# Patient Record
Sex: MALE | Race: AMERICAN INDIAN OR ALASKA NATIVE
[De-identification: names, ages, dates, MRNs, and addresses within clinical notes are randomized per-mention and may not be internally consistent; named-entity substitution may affect disease eponyms.]

---

## 2017-01-16 ENCOUNTER — HOSPITAL ENCOUNTER (EMERGENCY)
Dept: HOSPITAL 5 - ED | Age: 57
LOS: 1 days | Discharge: HOME | End: 2017-01-17
Payer: MEDICARE

## 2017-01-16 VITALS — DIASTOLIC BLOOD PRESSURE: 99 MMHG | SYSTOLIC BLOOD PRESSURE: 140 MMHG

## 2017-01-16 DIAGNOSIS — J45.909: ICD-10-CM

## 2017-01-16 DIAGNOSIS — Z98.890: ICD-10-CM

## 2017-01-16 DIAGNOSIS — M19.90: ICD-10-CM

## 2017-01-16 DIAGNOSIS — M77.8: ICD-10-CM

## 2017-01-16 DIAGNOSIS — K08.89: ICD-10-CM

## 2017-01-16 DIAGNOSIS — I10: ICD-10-CM

## 2017-01-16 DIAGNOSIS — G89.21: Primary | ICD-10-CM

## 2017-01-16 DIAGNOSIS — F17.200: ICD-10-CM

## 2017-01-16 DIAGNOSIS — K40.90: ICD-10-CM

## 2017-01-16 DIAGNOSIS — K21.9: ICD-10-CM

## 2017-01-16 LAB
ANION GAP SERPL CALC-SCNC: 16 MMOL/L
BASOPHILS NFR BLD AUTO: 0.8 % (ref 0–1.8)
BUN SERPL-MCNC: 6 MG/DL (ref 9–20)
BUN/CREAT SERPL: 7.5 %
CALCIUM SERPL-MCNC: 8.5 MG/DL (ref 8.4–10.2)
CHLORIDE SERPL-SCNC: 105.4 MMOL/L (ref 98–107)
CO2 SERPL-SCNC: 22 MMOL/L (ref 22–30)
EOSINOPHIL NFR BLD AUTO: 9.1 % (ref 0–4.3)
GLUCOSE SERPL-MCNC: 98 MG/DL (ref 75–100)
HCT VFR BLD CALC: 40.3 % (ref 35.5–45.6)
HGB BLD-MCNC: 13.4 GM/DL (ref 11.8–15.2)
MCH RBC QN AUTO: 32 PG (ref 28–32)
MCHC RBC AUTO-ENTMCNC: 33 % (ref 32–34)
MCV RBC AUTO: 97 FL (ref 84–94)
PLATELET # BLD: 259 K/MM3 (ref 140–440)
POTASSIUM SERPL-SCNC: 3.6 MMOL/L (ref 3.6–5)
RBC # BLD AUTO: 4.17 M/MM3 (ref 3.65–5.03)
SODIUM SERPL-SCNC: 140 MMOL/L (ref 137–145)
WBC # BLD AUTO: 9.2 K/MM3 (ref 4.5–11)

## 2017-01-16 PROCEDURE — 99283 EMERGENCY DEPT VISIT LOW MDM: CPT

## 2017-01-16 PROCEDURE — 80048 BASIC METABOLIC PNL TOTAL CA: CPT

## 2017-01-16 PROCEDURE — 96372 THER/PROPH/DIAG INJ SC/IM: CPT

## 2017-01-16 PROCEDURE — 85025 COMPLETE CBC W/AUTO DIFF WBC: CPT

## 2017-01-16 PROCEDURE — 36415 COLL VENOUS BLD VENIPUNCTURE: CPT

## 2017-01-16 PROCEDURE — 73030 X-RAY EXAM OF SHOULDER: CPT

## 2017-01-16 NOTE — XRAY REPORT
FINAL REPORT



PROCEDURE:  XR SHOULDER 2 RT



TECHNIQUE:  Right shoulder radiographs including AP views in

internal and external rotation and abduction. CPT 75812







HISTORY:  right shoulder pain 



COMPARISON:  No prior studies are available for comparison.



FINDINGS:  

Fracture (s) and/or Dislocation(s): None .



Joint space(s): Spurring of the acromioclavicular joint.



Soft tissues: Normal .



Bone mineralization: Normal .



Foreign bodies: None .







IMPRESSION:  

Acromioclavicular spurring
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
## 2017-03-11 ENCOUNTER — HOSPITAL ENCOUNTER (EMERGENCY)
Dept: HOSPITAL 5 - ED | Age: 57
LOS: 1 days | Discharge: HOME | End: 2017-03-12
Payer: COMMERCIAL
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
## 2017-03-11 DIAGNOSIS — S16.1XXA: Primary | ICD-10-CM
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
## 2017-03-11 DIAGNOSIS — S30.0XXA: ICD-10-CM
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
## 2017-03-11 PROCEDURE — 72125 CT NECK SPINE W/O DYE: CPT

## 2017-03-11 PROCEDURE — 72131 CT LUMBAR SPINE W/O DYE: CPT

## 2017-03-11 PROCEDURE — 99284 EMERGENCY DEPT VISIT MOD MDM: CPT

## 2017-03-11 PROCEDURE — 73502 X-RAY EXAM HIP UNI 2-3 VIEWS: CPT

## 2017-03-11 PROCEDURE — 94640 AIRWAY INHALATION TREATMENT: CPT

## 2017-03-11 PROCEDURE — 96372 THER/PROPH/DIAG INJ SC/IM: CPT

## 2017-03-12 VITALS — DIASTOLIC BLOOD PRESSURE: 97 MMHG | SYSTOLIC BLOOD PRESSURE: 139 MMHG
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
## 2017-09-18 ENCOUNTER — HOSPITAL ENCOUNTER (EMERGENCY)
Dept: HOSPITAL 5 - ED | Age: 57
Discharge: HOME | End: 2017-09-18
Payer: MEDICARE

## 2017-09-18 VITALS — SYSTOLIC BLOOD PRESSURE: 153 MMHG | DIASTOLIC BLOOD PRESSURE: 97 MMHG

## 2017-09-18 DIAGNOSIS — M19.90: ICD-10-CM

## 2017-09-18 DIAGNOSIS — F17.200: ICD-10-CM

## 2017-09-18 DIAGNOSIS — I10: ICD-10-CM

## 2017-09-18 DIAGNOSIS — K02.9: Primary | ICD-10-CM

## 2017-09-18 DIAGNOSIS — K21.9: ICD-10-CM

## 2017-09-18 DIAGNOSIS — J45.909: ICD-10-CM

## 2017-09-18 DIAGNOSIS — I25.2: ICD-10-CM

## 2017-09-18 DIAGNOSIS — J32.9: ICD-10-CM

## 2017-09-18 LAB
ALBUMIN SERPL-MCNC: 4.3 G/DL (ref 3.9–5)
ALBUMIN/GLOB SERPL: 1.3 %
ALP SERPL-CCNC: 65 UNITS/L (ref 35–129)
ALT SERPL-CCNC: 25 UNITS/L (ref 7–56)
ANION GAP SERPL CALC-SCNC: 18 MMOL/L
BILIRUB SERPL-MCNC: 0.5 MG/DL (ref 0.1–1.2)
BLASTOCYTES % (MANUAL): 0 %
BUN SERPL-MCNC: 11 MG/DL (ref 9–20)
BUN/CREAT SERPL: 15.71 %
CALCIUM SERPL-MCNC: 9.5 MG/DL (ref 8.4–10.2)
CHLORIDE SERPL-SCNC: 104.9 MMOL/L (ref 98–107)
CO2 SERPL-SCNC: 21 MMOL/L (ref 22–30)
GLUCOSE SERPL-MCNC: 91 MG/DL (ref 75–100)
HCT VFR BLD CALC: 45.9 % (ref 35.5–45.6)
HGB BLD-MCNC: 15.8 GM/DL (ref 11.8–15.2)
MCH RBC QN AUTO: 33 PG (ref 28–32)
MCHC RBC AUTO-ENTMCNC: 35 % (ref 32–34)
MCV RBC AUTO: 95 FL (ref 84–94)
PLATELET # BLD: 250 K/MM3 (ref 140–440)
POTASSIUM SERPL-SCNC: 3.9 MMOL/L (ref 3.6–5)
PROT SERPL-MCNC: 7.6 G/DL (ref 6.3–8.2)
RBC # BLD AUTO: 4.84 M/MM3 (ref 3.65–5.03)
SODIUM SERPL-SCNC: 140 MMOL/L (ref 137–145)
TOTAL CELLS COUNTED PERCENT: 22
WBC # BLD AUTO: 7.7 K/MM3 (ref 4.5–11)

## 2017-09-18 PROCEDURE — 71020: CPT

## 2017-09-18 PROCEDURE — 85025 COMPLETE CBC W/AUTO DIFF WBC: CPT

## 2017-09-18 PROCEDURE — 80053 COMPREHEN METABOLIC PANEL: CPT

## 2017-09-18 PROCEDURE — 85007 BL SMEAR W/DIFF WBC COUNT: CPT

## 2017-09-18 PROCEDURE — 36415 COLL VENOUS BLD VENIPUNCTURE: CPT

## 2017-09-18 PROCEDURE — 96372 THER/PROPH/DIAG INJ SC/IM: CPT

## 2017-09-18 PROCEDURE — 99284 EMERGENCY DEPT VISIT MOD MDM: CPT

## 2017-09-18 NOTE — EMERGENCY DEPARTMENT REPORT
Chief Complaint: Pain General


Stated Complaint: HIGH BP/DIZZINESS/VOMITING


Time Seen by Provider: 09/18/17 11:01





- HPI


History of Present Illness: 





PT c/o facial tightness and ear pain.  PT States his nose feels stopped up.  PT 

states he has tooth pain, cough and sore throat 





PT states today, he coughed so hard, he vomited


pt also states coughing hurts his hernia 





- ROS


Review of Systems: 





+ cough 








- Exam


Vital Signs: 


 Vital Signs











  09/18/17





  10:55


 


Temperature 97.9 F


 


Pulse Rate 65


 


Respiratory 20





Rate 


 


Blood Pressure 156/105


 


O2 Sat by Pulse 100





Oximetry 











Physical Exam: 





PT is alert and appropriate


no focal weakness


no facial swelling noted. 


MSE screening note: 


Focused history and physical exam performed.


Due to findings the following was ordered:





labs, xr 





ED Disposition for MSE


Condition: Stable
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
## 2018-05-20 ENCOUNTER — HOSPITAL ENCOUNTER (EMERGENCY)
Dept: HOSPITAL 5 - ED | Age: 58
Discharge: HOME | End: 2018-05-20
Payer: MEDICARE

## 2018-05-20 VITALS — SYSTOLIC BLOOD PRESSURE: 118 MMHG | DIASTOLIC BLOOD PRESSURE: 83 MMHG

## 2018-05-20 DIAGNOSIS — I10: ICD-10-CM

## 2018-05-20 DIAGNOSIS — F41.9: ICD-10-CM

## 2018-05-20 DIAGNOSIS — K40.90: Primary | ICD-10-CM

## 2018-05-20 DIAGNOSIS — F17.200: ICD-10-CM

## 2018-05-20 DIAGNOSIS — I25.2: ICD-10-CM

## 2018-05-20 DIAGNOSIS — K21.9: ICD-10-CM

## 2018-05-20 DIAGNOSIS — M19.90: ICD-10-CM

## 2018-05-20 LAB
ALBUMIN SERPL-MCNC: 4 G/DL (ref 3.9–5)
ALT SERPL-CCNC: 12 UNITS/L (ref 7–56)
BAND NEUTROPHILS # (MANUAL): 0 K/MM3
BILIRUB UR QL STRIP: (no result)
BLOOD UR QL VISUAL: (no result)
BUN SERPL-MCNC: 5 MG/DL (ref 9–20)
BUN/CREAT SERPL: 7 %
CALCIUM SERPL-MCNC: 9.3 MG/DL (ref 8.4–10.2)
HCT VFR BLD CALC: 41.1 % (ref 35.5–45.6)
HEMOLYSIS INDEX: 3
HGB BLD-MCNC: 13.8 GM/DL (ref 11.8–15.2)
MCH RBC QN AUTO: 31 PG (ref 28–32)
MCHC RBC AUTO-ENTMCNC: 34 % (ref 32–34)
MCV RBC AUTO: 93 FL (ref 84–94)
MYELOCYTES # (MANUAL): 0 K/MM3
PH UR STRIP: 7 [PH] (ref 5–7)
PLATELET # BLD: 306 K/MM3 (ref 140–440)
PROMYELOCYTES # (MANUAL): 0 K/MM3
PROT UR STRIP-MCNC: (no result) MG/DL
RBC # BLD AUTO: 4.43 M/MM3 (ref 3.65–5.03)
RBC #/AREA URNS HPF: 1 /HPF (ref 0–6)
TOTAL CELLS COUNTED BLD: 100
UROBILINOGEN UR-MCNC: < 2 MG/DL (ref ?–2)
WBC #/AREA URNS HPF: < 1 /HPF (ref 0–6)

## 2018-05-20 PROCEDURE — 80053 COMPREHEN METABOLIC PANEL: CPT

## 2018-05-20 PROCEDURE — 84484 ASSAY OF TROPONIN QUANT: CPT

## 2018-05-20 PROCEDURE — 81001 URINALYSIS AUTO W/SCOPE: CPT

## 2018-05-20 PROCEDURE — 85025 COMPLETE CBC W/AUTO DIFF WBC: CPT

## 2018-05-20 PROCEDURE — 93005 ELECTROCARDIOGRAM TRACING: CPT

## 2018-05-20 PROCEDURE — 83690 ASSAY OF LIPASE: CPT

## 2018-05-20 PROCEDURE — 93010 ELECTROCARDIOGRAM REPORT: CPT

## 2018-05-20 PROCEDURE — 99284 EMERGENCY DEPT VISIT MOD MDM: CPT

## 2018-05-20 PROCEDURE — 36415 COLL VENOUS BLD VENIPUNCTURE: CPT

## 2018-05-20 PROCEDURE — 71045 X-RAY EXAM CHEST 1 VIEW: CPT

## 2018-05-20 PROCEDURE — 85007 BL SMEAR W/DIFF WBC COUNT: CPT

## 2018-05-20 NOTE — XRAY REPORT
FINAL REPORT



PROCEDURE:  XR CHEST 1V AP



TECHNIQUE:  Chest radiograph anteroposterior view. CPT 64685







HISTORY:  Chest pain 



COMPARISON:  No prior studies are available for comparison.



FINDINGS:  

Heart: The cardiac silhouette is at the upper limit of normal in

size 



Mediastinum/Vessels: Normal.



Lungs/Pleural space: No infiltrate, effusion, or pneumothorax.



Bony thorax: No acute osseous abnormality.



Life support devices: None.



IMPRESSION:  

No radiographic evidence of acute cardiopulmonary abnormality.

## 2018-05-20 NOTE — EMERGENCY DEPARTMENT REPORT
Blank Doc





- Documentation


Documentation: 





57-year-old male presents to the hospital with multiple complaints. He 

complains of chest pain bilateral left greater than right.  This is 

reproducible on palpation.  He also complains of one week of difficulty 

swallowing liquids.  He states that it makes him uncomfortable and he feels 

like it stuck in his throat which makes it hard for him to breathe.  He 

complains of feeling like his throat is closing not with associated shortness 

of breath





On exam oropharynx looks normal.  No stridor.  Reproducible anterior chest wall 

tenderness and mild expiratory wheezing





Pt will be transfered to the acute side for further evaluation.
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
## 2018-07-29 ENCOUNTER — HOSPITAL ENCOUNTER (EMERGENCY)
Dept: HOSPITAL 5 - ED | Age: 58
Discharge: HOME | End: 2018-07-29
Payer: MEDICARE

## 2018-07-29 VITALS — DIASTOLIC BLOOD PRESSURE: 91 MMHG | SYSTOLIC BLOOD PRESSURE: 133 MMHG

## 2018-07-29 DIAGNOSIS — M19.90: ICD-10-CM

## 2018-07-29 DIAGNOSIS — F17.200: ICD-10-CM

## 2018-07-29 DIAGNOSIS — I10: ICD-10-CM

## 2018-07-29 DIAGNOSIS — G89.29: ICD-10-CM

## 2018-07-29 DIAGNOSIS — K21.9: ICD-10-CM

## 2018-07-29 DIAGNOSIS — J45.909: ICD-10-CM

## 2018-07-29 DIAGNOSIS — I25.2: ICD-10-CM

## 2018-07-29 DIAGNOSIS — Z79.82: ICD-10-CM

## 2018-07-29 DIAGNOSIS — M79.652: Primary | ICD-10-CM

## 2018-07-29 PROCEDURE — 96372 THER/PROPH/DIAG INJ SC/IM: CPT

## 2018-07-29 PROCEDURE — 99282 EMERGENCY DEPT VISIT SF MDM: CPT
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
## 2018-09-12 ENCOUNTER — HOSPITAL ENCOUNTER (EMERGENCY)
Dept: HOSPITAL 5 - ED | Age: 58
Discharge: HOME | End: 2018-09-12
Payer: MEDICARE

## 2018-09-12 VITALS — SYSTOLIC BLOOD PRESSURE: 165 MMHG | DIASTOLIC BLOOD PRESSURE: 94 MMHG

## 2018-09-12 DIAGNOSIS — I25.2: ICD-10-CM

## 2018-09-12 DIAGNOSIS — Z79.82: ICD-10-CM

## 2018-09-12 DIAGNOSIS — F12.90: ICD-10-CM

## 2018-09-12 DIAGNOSIS — F17.200: ICD-10-CM

## 2018-09-12 DIAGNOSIS — M54.9: ICD-10-CM

## 2018-09-12 DIAGNOSIS — J45.909: ICD-10-CM

## 2018-09-12 DIAGNOSIS — M19.90: ICD-10-CM

## 2018-09-12 DIAGNOSIS — K21.9: ICD-10-CM

## 2018-09-12 DIAGNOSIS — M25.552: Primary | ICD-10-CM

## 2018-09-12 DIAGNOSIS — G89.29: ICD-10-CM

## 2018-09-12 DIAGNOSIS — I10: ICD-10-CM

## 2018-09-12 PROCEDURE — 99283 EMERGENCY DEPT VISIT LOW MDM: CPT

## 2018-09-12 NOTE — XRAY REPORT
LEFT HIP, 2 views:



History: Chronic pain.



Normal bone mineralization. Minimal osteoarthritic changes are 

identified at the left knee. No evidence for fracture, bone lesion or 

osteonecrosis. Left inguinal hernia repair is suspected.



IMPRESSION:

Minimal osteoarthritis at the left hip.
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
## 2019-01-13 ENCOUNTER — HOSPITAL ENCOUNTER (EMERGENCY)
Dept: HOSPITAL 5 - ED | Age: 59
Discharge: HOME | End: 2019-01-13
Payer: MEDICARE

## 2019-01-13 VITALS — DIASTOLIC BLOOD PRESSURE: 97 MMHG | SYSTOLIC BLOOD PRESSURE: 150 MMHG

## 2019-01-13 DIAGNOSIS — K21.9: ICD-10-CM

## 2019-01-13 DIAGNOSIS — M19.90: ICD-10-CM

## 2019-01-13 DIAGNOSIS — Z79.899: ICD-10-CM

## 2019-01-13 DIAGNOSIS — F12.10: ICD-10-CM

## 2019-01-13 DIAGNOSIS — I25.2: ICD-10-CM

## 2019-01-13 DIAGNOSIS — J40: Primary | ICD-10-CM

## 2019-01-13 DIAGNOSIS — J45.909: ICD-10-CM

## 2019-01-13 DIAGNOSIS — G89.29: ICD-10-CM

## 2019-01-13 DIAGNOSIS — I10: ICD-10-CM

## 2019-01-13 DIAGNOSIS — F17.200: ICD-10-CM

## 2019-01-13 LAB
ALBUMIN SERPL-MCNC: 4 G/DL (ref 3.9–5)
ALT SERPL-CCNC: 11 UNITS/L (ref 7–56)
BASOPHILS # (AUTO): 0.1 K/MM3 (ref 0–0.1)
BASOPHILS NFR BLD AUTO: 0.6 % (ref 0–1.8)
BUN SERPL-MCNC: 5 MG/DL (ref 9–20)
BUN/CREAT SERPL: 7 %
CALCIUM SERPL-MCNC: 9.1 MG/DL (ref 8.4–10.2)
EOSINOPHIL # BLD AUTO: 0.9 K/MM3 (ref 0–0.4)
EOSINOPHIL NFR BLD AUTO: 10.4 % (ref 0–4.3)
HCT VFR BLD CALC: 37.6 % (ref 35.5–45.6)
HEMOLYSIS INDEX: 6
HGB BLD-MCNC: 12.5 GM/DL (ref 11.8–15.2)
LYMPHOCYTES # BLD AUTO: 2.4 K/MM3 (ref 1.2–5.4)
LYMPHOCYTES NFR BLD AUTO: 27.6 % (ref 13.4–35)
MCHC RBC AUTO-ENTMCNC: 33 % (ref 32–34)
MCV RBC AUTO: 93 FL (ref 84–94)
MONOCYTES # (AUTO): 0.5 K/MM3 (ref 0–0.8)
MONOCYTES % (AUTO): 6.2 % (ref 0–7.3)
PLATELET # BLD: 298 K/MM3 (ref 140–440)
RBC # BLD AUTO: 4.05 M/MM3 (ref 3.65–5.03)

## 2019-01-13 PROCEDURE — 83690 ASSAY OF LIPASE: CPT

## 2019-01-13 PROCEDURE — 99284 EMERGENCY DEPT VISIT MOD MDM: CPT

## 2019-01-13 PROCEDURE — 93010 ELECTROCARDIOGRAM REPORT: CPT

## 2019-01-13 PROCEDURE — 85025 COMPLETE CBC W/AUTO DIFF WBC: CPT

## 2019-01-13 PROCEDURE — 36415 COLL VENOUS BLD VENIPUNCTURE: CPT

## 2019-01-13 PROCEDURE — 71046 X-RAY EXAM CHEST 2 VIEWS: CPT

## 2019-01-13 PROCEDURE — 84484 ASSAY OF TROPONIN QUANT: CPT

## 2019-01-13 PROCEDURE — 93005 ELECTROCARDIOGRAM TRACING: CPT

## 2019-01-13 PROCEDURE — 94640 AIRWAY INHALATION TREATMENT: CPT

## 2019-01-13 PROCEDURE — 80053 COMPREHEN METABOLIC PANEL: CPT

## 2019-01-13 NOTE — XRAY REPORT
FINAL REPORT



EXAM:  XR CHEST ROUTINE 2V



HISTORY:  cough fever 



TECHNIQUE:  PA and lateral chest radiographs 



PRIORS:  05/20/2018



FINDINGS:  

No mediastinal shift. Cardiac silhouette is not enlarged.  No pneumothorax, effusion, or focal pulmon
brayan opacity. No acute skeletal finding. 



IMPRESSION:  

No focal pulmonary opacity.
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
## 2019-08-07 ENCOUNTER — HOSPITAL ENCOUNTER (EMERGENCY)
Dept: HOSPITAL 5 - ED | Age: 59
LOS: 1 days | Discharge: HOME | End: 2019-08-08
Payer: MEDICARE

## 2019-08-07 DIAGNOSIS — K08.89: ICD-10-CM

## 2019-08-07 DIAGNOSIS — Z79.899: ICD-10-CM

## 2019-08-07 DIAGNOSIS — F17.200: ICD-10-CM

## 2019-08-07 DIAGNOSIS — J40: ICD-10-CM

## 2019-08-07 DIAGNOSIS — M19.90: ICD-10-CM

## 2019-08-07 DIAGNOSIS — G89.29: ICD-10-CM

## 2019-08-07 DIAGNOSIS — K21.9: ICD-10-CM

## 2019-08-07 DIAGNOSIS — I25.2: ICD-10-CM

## 2019-08-07 DIAGNOSIS — R07.89: Primary | ICD-10-CM

## 2019-08-07 DIAGNOSIS — F12.10: ICD-10-CM

## 2019-08-07 DIAGNOSIS — I10: ICD-10-CM

## 2019-08-07 LAB
BASOPHILS # (AUTO): 0.1 K/MM3 (ref 0–0.1)
BASOPHILS NFR BLD AUTO: 0.8 % (ref 0–1.8)
EOSINOPHIL # BLD AUTO: 1 K/MM3 (ref 0–0.4)
EOSINOPHIL NFR BLD AUTO: 10.8 % (ref 0–4.3)
HCT VFR BLD CALC: 39.1 % (ref 35.5–45.6)
HGB BLD-MCNC: 13.1 GM/DL (ref 11.8–15.2)
LYMPHOCYTES # BLD AUTO: 2.6 K/MM3 (ref 1.2–5.4)
LYMPHOCYTES NFR BLD AUTO: 26.8 % (ref 13.4–35)
MCHC RBC AUTO-ENTMCNC: 34 % (ref 32–34)
MCV RBC AUTO: 94 FL (ref 84–94)
MONOCYTES # (AUTO): 0.7 K/MM3 (ref 0–0.8)
MONOCYTES % (AUTO): 6.9 % (ref 0–7.3)
PLATELET # BLD: 247 K/MM3 (ref 140–440)
RBC # BLD AUTO: 4.17 M/MM3 (ref 3.65–5.03)

## 2019-08-07 PROCEDURE — 84484 ASSAY OF TROPONIN QUANT: CPT

## 2019-08-07 PROCEDURE — 94640 AIRWAY INHALATION TREATMENT: CPT

## 2019-08-07 PROCEDURE — 82553 CREATINE MB FRACTION: CPT

## 2019-08-07 PROCEDURE — 71046 X-RAY EXAM CHEST 2 VIEWS: CPT

## 2019-08-07 PROCEDURE — 36415 COLL VENOUS BLD VENIPUNCTURE: CPT

## 2019-08-07 PROCEDURE — 93010 ELECTROCARDIOGRAM REPORT: CPT

## 2019-08-07 PROCEDURE — 96372 THER/PROPH/DIAG INJ SC/IM: CPT

## 2019-08-07 PROCEDURE — 93005 ELECTROCARDIOGRAM TRACING: CPT

## 2019-08-07 PROCEDURE — 94644 CONT INHLJ TX 1ST HOUR: CPT

## 2019-08-07 PROCEDURE — 82550 ASSAY OF CK (CPK): CPT

## 2019-08-07 PROCEDURE — 85025 COMPLETE CBC W/AUTO DIFF WBC: CPT

## 2019-08-07 PROCEDURE — 99284 EMERGENCY DEPT VISIT MOD MDM: CPT

## 2019-08-07 NOTE — XRAY REPORT
CHEST 2 VIEWS 



INDICATION: 

Dyspnea.



COMPARISON: 

9/18/2017.



FINDINGS:

Support devices: None.



Heart: Within normal limits. 

Lungs/Pleura: No acute air space or interstitial disease.   No significant pleural effusion. 



IMPRESSION:  No acute findings.



Signer Name: Arjun Urias MD 

Signed: 8/7/2019 8:21 PM

 Workstation Name: [id number]

## 2019-08-08 VITALS — SYSTOLIC BLOOD PRESSURE: 141 MMHG | DIASTOLIC BLOOD PRESSURE: 82 MMHG
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
## 2019-10-16 ENCOUNTER — HOSPITAL ENCOUNTER (OUTPATIENT)
Dept: HOSPITAL 5 - ED | Age: 59
Setting detail: OBSERVATION
LOS: 2 days | Discharge: HOME HEALTH | End: 2019-10-18
Attending: INTERNAL MEDICINE | Admitting: INTERNAL MEDICINE
Payer: MEDICARE

## 2019-10-16 DIAGNOSIS — R07.89: ICD-10-CM

## 2019-10-16 DIAGNOSIS — J93.9: Primary | ICD-10-CM
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
## 2019-10-16 DIAGNOSIS — F19.10: ICD-10-CM
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
## 2019-10-16 DIAGNOSIS — S22.42XA: ICD-10-CM

## 2019-10-16 LAB
ALBUMIN SERPL-MCNC: 4.6 G/DL (ref 3.9–5)
ALT SERPL-CCNC: 19 UNITS/L (ref 7–56)
APTT BLD: 32.1 SEC. (ref 24.2–36.6)
BASOPHILS # (AUTO): 0.1 K/MM3 (ref 0–0.1)
BASOPHILS NFR BLD AUTO: 0.5 % (ref 0–1.8)
BENZODIAZEPINES SCREEN,URINE: (no result)
BILIRUB UR QL STRIP: (no result)
BLOOD UR QL VISUAL: (no result)
BUN SERPL-MCNC: 7 MG/DL (ref 9–20)
BUN/CREAT SERPL: 10 %
CALCIUM SERPL-MCNC: 9.4 MG/DL (ref 8.4–10.2)
EOSINOPHIL # BLD AUTO: 0.2 K/MM3 (ref 0–0.4)
EOSINOPHIL NFR BLD AUTO: 1.5 % (ref 0–4.3)
HCT VFR BLD CALC: 39.3 % (ref 35.5–45.6)
HEMOLYSIS INDEX: 47
HGB BLD-MCNC: 13.1 GM/DL (ref 11.8–15.2)
INR PPP: 1.48 (ref 0.87–1.13)
LYMPHOCYTES # BLD AUTO: 2.3 K/MM3 (ref 1.2–5.4)
LYMPHOCYTES NFR BLD AUTO: 20.9 % (ref 13.4–35)
MCHC RBC AUTO-ENTMCNC: 33 % (ref 32–34)
MCV RBC AUTO: 92 FL (ref 84–94)
METHADONE SCREEN,URINE: (no result)
MONOCYTES # (AUTO): 0.8 K/MM3 (ref 0–0.8)
MONOCYTES % (AUTO): 7.2 % (ref 0–7.3)
OPIATE SCREEN,URINE: (no result)
PH UR STRIP: 7 [PH] (ref 5–7)
PLATELET # BLD: 264 K/MM3 (ref 140–440)
PROT UR STRIP-MCNC: (no result) MG/DL
RBC # BLD AUTO: 4.28 M/MM3 (ref 3.65–5.03)
RBC #/AREA URNS HPF: < 1 /HPF (ref 0–6)
UROBILINOGEN UR-MCNC: < 2 MG/DL (ref ?–2)
WBC #/AREA URNS HPF: < 1 /HPF (ref 0–6)

## 2019-10-16 PROCEDURE — 92610 EVALUATE SWALLOWING FUNCTION: CPT

## 2019-10-16 PROCEDURE — 85610 PROTHROMBIN TIME: CPT

## 2019-10-16 PROCEDURE — 70486 CT MAXILLOFACIAL W/O DYE: CPT

## 2019-10-16 PROCEDURE — 90686 IIV4 VACC NO PRSV 0.5 ML IM: CPT

## 2019-10-16 PROCEDURE — 99291 CRITICAL CARE FIRST HOUR: CPT

## 2019-10-16 PROCEDURE — 93005 ELECTROCARDIOGRAM TRACING: CPT

## 2019-10-16 PROCEDURE — 96374 THER/PROPH/DIAG INJ IV PUSH: CPT

## 2019-10-16 PROCEDURE — 72125 CT NECK SPINE W/O DYE: CPT

## 2019-10-16 PROCEDURE — 84484 ASSAY OF TROPONIN QUANT: CPT

## 2019-10-16 PROCEDURE — 97162 PT EVAL MOD COMPLEX 30 MIN: CPT

## 2019-10-16 PROCEDURE — 99406 BEHAV CHNG SMOKING 3-10 MIN: CPT

## 2019-10-16 PROCEDURE — 80048 BASIC METABOLIC PNL TOTAL CA: CPT

## 2019-10-16 PROCEDURE — G0378 HOSPITAL OBSERVATION PER HR: HCPCS

## 2019-10-16 PROCEDURE — 94640 AIRWAY INHALATION TREATMENT: CPT

## 2019-10-16 PROCEDURE — 80053 COMPREHEN METABOLIC PANEL: CPT

## 2019-10-16 PROCEDURE — 81001 URINALYSIS AUTO W/SCOPE: CPT

## 2019-10-16 PROCEDURE — 85025 COMPLETE CBC W/AUTO DIFF WBC: CPT

## 2019-10-16 PROCEDURE — 82140 ASSAY OF AMMONIA: CPT

## 2019-10-16 PROCEDURE — 80307 DRUG TEST PRSMV CHEM ANLYZR: CPT

## 2019-10-16 PROCEDURE — 70450 CT HEAD/BRAIN W/O DYE: CPT

## 2019-10-16 PROCEDURE — 73562 X-RAY EXAM OF KNEE 3: CPT

## 2019-10-16 PROCEDURE — 80320 DRUG SCREEN QUANTALCOHOLS: CPT

## 2019-10-16 PROCEDURE — 87086 URINE CULTURE/COLONY COUNT: CPT

## 2019-10-16 PROCEDURE — 94760 N-INVAS EAR/PLS OXIMETRY 1: CPT

## 2019-10-16 PROCEDURE — 93010 ELECTROCARDIOGRAM REPORT: CPT

## 2019-10-16 PROCEDURE — 85730 THROMBOPLASTIN TIME PARTIAL: CPT

## 2019-10-16 PROCEDURE — 71250 CT THORAX DX C-: CPT

## 2019-10-16 PROCEDURE — 71045 X-RAY EXAM CHEST 1 VIEW: CPT

## 2019-10-16 PROCEDURE — 73502 X-RAY EXAM HIP UNI 2-3 VIEWS: CPT

## 2019-10-16 PROCEDURE — G0480 DRUG TEST DEF 1-7 CLASSES: HCPCS

## 2019-10-16 PROCEDURE — 96376 TX/PRO/DX INJ SAME DRUG ADON: CPT

## 2019-10-16 PROCEDURE — 97116 GAIT TRAINING THERAPY: CPT

## 2019-10-16 PROCEDURE — 36415 COLL VENOUS BLD VENIPUNCTURE: CPT

## 2019-10-16 NOTE — EMERGENCY DEPARTMENT REPORT
Blank Doc





- Documentation


Documentation: 





59-year-old male that presents with AMS s/p fall and LOC.





This initial assessment/diagnostic orders/clinical plan/treatment(s) is/are 

subject to change based on patient's health status, clinical progression and re-

assessment by fellow clinical providers in the ED.  Further treatment and workup

at subsequent clinical providers discretion.  Patient/guardians urged not to 

elope from the ED as their condition may be serious if not clinically assessed 

and managed.  Initial orders include:


1- Patient sent to [facility] for further evaluation and treatment


2- CT scans


3- cervical collar


4- labs


5- EKG
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
## 2019-10-16 NOTE — CAT SCAN REPORT
CT MAXILLOFACIAL WITHOUT CONTRAST



INDICATION / CLINICAL INFORMATION:

ams/fall/trauma.



TECHNIQUE:

All CT scans at this location are performed using CT dose reduction for ALARA by means of automated e
xposure control. 



COMPARISON:

None available.



FINDINGS:



FACIAL BONES: No fracture or other significant abnormality.

PARANASAL SINUSES: Mild mucosal thickening is seen within several anterior ethmoid air cells bilatera
lly. Frontal and maxillary sinuses appear clear. Sphenoid sinuses are poorly developed. Mastoid air c
ells and middle ear cavities are normally pneumatized..

NASAL CAVITY:No abnornality.

ORBITS: Soft tissue swelling is observed along the left side of the forehead extending down to involv
e the left upper eyelid. This is likely due to recent injury.



VISUALIZED INTRACRANIAL STRUCTURES: No significant abnormality.  



ADDITIONAL FINDINGS: None.



IMPRESSION:

1.   Soft tissue swelling left forehead and superior orbital rim extending down to involve the upper 
eyelid.

2. No indication of facial fracture.



Signer Name: Jase Guido MD 

Signed: 10/16/2019 8:30 PM

 Workstation Name: [id number]

## 2019-10-16 NOTE — XRAY REPORT
CHEST 1 VIEW 



INDICATION / CLINICAL INFORMATION:

cough, SOB. 



COMPARISON: 

8/7/2019



FINDINGS:



SUPPORT DEVICES: None.

HEART / MEDIASTINUM: No significant abnormality. 

LUNGS / PLEURA: No significant pulmonary or pleural abnormality. No pneumothorax. 



ADDITIONAL FINDINGS: No significant additional findings.



IMPRESSION:

1. No significant change



Signer Name: Jj Anthony MD 

Signed: 10/16/2019 9:40 PM

 Workstation Name: [id number]

## 2019-10-16 NOTE — CAT SCAN REPORT
CT CERVICAL SPINE WITHOUT CONTRAST



INDICATION / CLINICAL INFORMATION:

ams/fall/trauma.



TECHNIQUE:

Axial CT images were obtained through the cervical spine. Sagittal and coronal reformatted images wer
e produced. All CT scans at this location are performed using CT dose reduction for ALARA by means of
 automated exposure control. 



COMPARISON:

None available.



FINDINGS:



ALIGNMENT: Normal alignment is maintained throughout the cervical region.



VERTEBRAE: Is no indication of fracture or traumatic subluxation.



DISC SPACES: Disc height is decreased at the C6-7 level where disc vacuum phenomena is noted.



INDIVIDUAL LEVEL ANALYSIS:



C2-3: Small central disc protrusion with mild thecal sac deformity. Central spinal canal and neurofor
nicolette are adequately maintained.



C3-4: Right worse than left uncovertebral arthropathy is associated with severe right worse than left
 neuroforaminal stenosis at the C4 nerve root level.



C4-5: Posterior osteophyte formation flattens the thecal sac slightly. Uncovertebral arthropathy cont
ributes to moderate right-sided foraminal stenosis at the C5 nerve root level.



C5-6: Right worse than left facet and uncovertebral arthritic changes contribute to severe right-side
d and moderate left-sided neuroforaminal stenosis at the C6 nerve root level.



C6-7: Loss of disc height is noted. Prominent anterior osteophyte formation is noted. Posterior osteo
phyte is evident. Posterior osteophyte extends laterally out through the neural foramina bilaterally 
contributing to severe bilateral foraminal stenosis at the C7 nerve root level. Central spinal canal 
appears to be adequate in size.



C7-T1:No abnormality.





CRANIOCERVICAL JUNCTION:No significant abnormality.



SPINAL CANAL: There is no indication of central canal stenosis.



PARASPINAL SOFT TISSUES: No significant abnormality. 



ADDITIONAL FINDINGS: None.



LUNG APICES: There is a small left apical pneumothorax. Bullous changes are observed at the left lung
 apex.



IMPRESSION:





1. Widespread cervical spondylosis.

2. Small left apical pneumothorax.



I called a report of this study and reported the presence of a left apical pneumothorax to Dr. Nikhil harkins [facility] emergency department at about 1935 [place] standard time.



Signer Name: Jase Guido MD 

Signed: 10/16/2019 8:42 PM

 Workstation Name: [id number]

## 2019-10-16 NOTE — CAT SCAN REPORT
CT of the chest without contrast



INDICATION: Chest pain following trauma today



COMPARISON: None



FINDINGS: There is no mediastinal hemorrhage or definite evidence for thoracic aortic injury. No effu
sions are seen. Upper abdomen is unremarkable. The right lung and pleural space are clear. CT does co
nfirm a minimal left pneumothorax of around 5%. A few subpleural blebs are seen bilaterally. There is
 no pulmonary contusion or mass. Bone windows confirm an impacted overriding fracture of the left ant
erior fourth rib as well as a vertical fracture through the left anterior third rib. There is associa
lucia air in the adjacent soft tissues and these fractures likely account for the small left pneumothor
ax. The remaining ribs are intact. There is no sternal fracture seen.



IMPRESSION: Left anterior rib fractures with small left pneumothorax.



Automated exposure control was utilized to diminish radiation dose.







Signer Name: Jj Anthony MD 

Signed: 10/16/2019 9:57 PM

 Workstation Name: [id number]

## 2019-10-16 NOTE — CAT SCAN REPORT
CT HEAD WITHOUT CONTRAST



INDICATION / CLINICAL INFORMATION:

ams/fall/trauma.



TECHNIQUE:

All CT scans at this location are performed using CT dose reduction for ALARA by means of automated e
xposure control. 



COMPARISON:

Head CT 5/22/2017 and MRI brain 5/23/2017



FINDINGS:

HEMORRHAGE: No evidence of intracranial hemorrhage or extra-axial fluid collection.

EXTRA-AXIAL SPACES: Cortical sulci, sylvian fissures and basilar cisterns have an unremarkable appear
ance.

VENTRICULAR SYSTEM: The ventricular system is of normal size and configuration.

CEREBRAL PARENCHYMA: No areas of abnormal brain parenchymal attenuation are identified. There is no i
ndication of recent infarction. 

MIDLINE SHIFT OR HERNIATION: There is no mass effect.

CEREBELLUM / BRAINSTEM: Brainstem and cerebellum have an unremarkable appearance.

INTRACRANIAL VESSELS:No abnormalities are identified on this noncontrast head CT.

ORBITS: Please refer to CT facial bones.



SOFT TISSUES of HEAD: No significant abnormality.

CALVARIUM: Evaluation of bone windows reveals no abnormalities.

PARANASAL SINUSES / MASTOID AIR CELLS: Please refer to CT facial bones report.



IMPRESSION:

1. No intracranial abnormality on head CT without contrast.



Signer Name: Jase Guido MD 

Signed: 10/16/2019 8:28 PM

 Workstation Name: [id number]

## 2019-10-17 RX ADMIN — CYCLOBENZAPRINE PRN MG: 10 TABLET, FILM COATED ORAL at 20:13

## 2019-10-17 RX ADMIN — MORPHINE SULFATE PRN MG: 2 INJECTION, SOLUTION INTRAMUSCULAR; INTRAVENOUS at 15:39

## 2019-10-17 RX ADMIN — OXYCODONE AND ACETAMINOPHEN PRN TAB: 5; 325 TABLET ORAL at 16:38

## 2019-10-17 RX ADMIN — ARFORMOTEROL TARTRATE SCH MCG: 15 SOLUTION RESPIRATORY (INHALATION) at 08:25

## 2019-10-17 RX ADMIN — ASPIRIN SCH MG: 81 TABLET, COATED ORAL at 10:04

## 2019-10-17 RX ADMIN — ARFORMOTEROL TARTRATE SCH MCG: 15 SOLUTION RESPIRATORY (INHALATION) at 20:56

## 2019-10-17 RX ADMIN — BUDESONIDE SCH MG: 0.5 INHALANT RESPIRATORY (INHALATION) at 20:54

## 2019-10-17 RX ADMIN — CYCLOBENZAPRINE PRN MG: 10 TABLET, FILM COATED ORAL at 10:04

## 2019-10-17 RX ADMIN — PANTOPRAZOLE SODIUM SCH: 20 TABLET, DELAYED RELEASE ORAL at 21:11

## 2019-10-17 RX ADMIN — NICOTINE SCH MG: 21 PATCH TRANSDERMAL at 10:04

## 2019-10-17 RX ADMIN — PANTOPRAZOLE SODIUM SCH MG: 20 TABLET, DELAYED RELEASE ORAL at 10:04

## 2019-10-17 RX ADMIN — BUDESONIDE SCH MG: 0.5 INHALANT RESPIRATORY (INHALATION) at 08:25

## 2019-10-17 RX ADMIN — OXYCODONE AND ACETAMINOPHEN PRN TAB: 5; 325 TABLET ORAL at 22:28

## 2019-10-17 RX ADMIN — AMLODIPINE BESYLATE SCH MG: 5 TABLET ORAL at 10:04

## 2019-10-17 RX ADMIN — PANTOPRAZOLE SODIUM SCH MG: 20 TABLET, DELAYED RELEASE ORAL at 20:14

## 2019-10-17 RX ADMIN — LISINOPRIL SCH MG: 20 TABLET ORAL at 10:04

## 2019-10-17 RX ADMIN — MORPHINE SULFATE PRN MG: 2 INJECTION, SOLUTION INTRAMUSCULAR; INTRAVENOUS at 06:49

## 2019-10-17 NOTE — PROGRESS NOTE
Assessment and Plan


Assessment and plan: 


Patient is a 60 yo male with hx of HTN, CAD, GERD, ARTHRITIS, ASTHMA AND CHRONIC

RIGHT HIP INJURY, CHRONIC BACK PAIN, Admitted following a fall and trauma to L 

chest, found to have L rib fracture and small PTX. Has some mild pain on that 

side with breathing/movement. 


No reports of chills, or hemptsis, Right hip pain per patient was aggravated 

following the fall.











Small Left Pneumothroax


Degenerative Joint disease


Left 3rd, 4th anterior rib fracture


Subtance abuse-Marijuana


Pleuritic chest pain secondary to Rib fracture








Plan


PT/OT


Ortho consult


Pulmonary consult. Discussed with Dr Washington, also present in the room during his

evaluation of the patient


Continue pain control


O2 as needed


Home meds


DVT.GI PROPHY


Anticipate discharge in am





History


Interval history: 


Pateint seen and examined, reports pain on his right hip, improved pleuritic 

chest pain, apart from initially refusing to work with PT, no other adverse 

event reported








Hospitalist Physical





- Constitutional


Vitals: 


                                        











Temp Pulse Resp BP Pulse Ox


 


 97.5 F L  59 L  17   149/93   100 


 


 10/17/19 07:40  10/17/19 10:00  10/17/19 08:00  10/17/19 07:40  10/17/19 07:40











General appearance: Present: mild distress, cachectic





- EENT


Eyes: Present: PERRL, EOM intact


ENT: hearing intact





- Neck


Neck: Present: supple, normal ROM





- Respiratory


Respiratory effort: normal


Respiratory: bilateral: CTA





- Cardiovascular


Rhythm: regular


Heart Sounds: Present: S1 & S2.  Absent: systolic murmur, diastolic murmur





- Extremities


Extremities: no ischemia, pulses intact, pulses symmetrical, No edema, normal 

temperature, normal color, Full ROM


Peripheral Pulses: within normal limits





- Abdominal


General gastrointestinal: soft, non-tender, non-distended, normal bowel sounds





- Integumentary


Integumentary: Present: warm, dry (generalized muslce wall pain)





- Psychiatric


Psychiatric: appropriate mood/affect, intact judgment & insight, memory intact, 

agitated





- Neurologic


Neurologic: CNII-XII intact, moves all extremities





- Allied Health


Allied health notes reviewed: nursing





Results





- Labs


CBC & Chem 7: 


                                 10/16/19 19:44





                                 10/18/19 05:18


Labs: 


                             Laboratory Last Values











WBC  11.2 K/mm3 (4.5-11.0)  H  10/16/19  19:44    


 


RBC  4.28 M/mm3 (3.65-5.03)   10/16/19  19:44    


 


Hgb  13.1 gm/dl (11.8-15.2)   10/16/19  19:44    


 


Hct  39.3 % (35.5-45.6)   10/16/19  19:44    


 


MCV  92 fl (84-94)   10/16/19  19:44    


 


MCH  31 pg (28-32)   10/16/19  19:44    


 


MCHC  33 % (32-34)   10/16/19  19:44    


 


RDW  13.3 % (13.2-15.2)   10/16/19  19:44    


 


Plt Count  264 K/mm3 (140-440)   10/16/19  19:44    


 


Lymph % (Auto)  20.9 % (13.4-35.0)   10/16/19  19:44    


 


Mono % (Auto)  7.2 % (0.0-7.3)   10/16/19  19:44    


 


Eos % (Auto)  1.5 % (0.0-4.3)   10/16/19  19:44    


 


Baso % (Auto)  0.5 % (0.0-1.8)   10/16/19  19:44    


 


Lymph #  2.3 K/mm3 (1.2-5.4)   10/16/19  19:44    


 


Mono #  0.8 K/mm3 (0.0-0.8)   10/16/19  19:44    


 


Eos #  0.2 K/mm3 (0.0-0.4)   10/16/19  19:44    


 


Baso #  0.1 K/mm3 (0.0-0.1)   10/16/19  19:44    


 


Seg Neutrophils %  69.9 % (40.0-70.0)   10/16/19  19:44    


 


Seg Neutrophils #  7.9 K/mm3 (1.8-7.7)  H  10/16/19  19:44    


 


PT  17.6 Sec. (12.2-14.9)  H  10/16/19  19:44    


 


INR  1.48  (0.87-1.13)  H  10/16/19  19:44    


 


APTT  32.1 Sec. (24.2-36.6)   10/16/19  19:44    


 


Sodium  144 mmol/L (137-145)   10/16/19  19:44    


 


Potassium  3.9 mmol/L (3.6-5.0)   10/16/19  19:44    


 


Chloride  105.4 mmol/L ()   10/16/19  19:44    


 


Carbon Dioxide  19 mmol/L (22-30)  L  10/16/19  19:44    


 


Anion Gap  24 mmol/L  10/16/19  19:44    


 


BUN  7 mg/dL (9-20)  L  10/16/19  19:44    


 


Creatinine  0.7 mg/dL (0.8-1.5)  L  10/16/19  19:44    


 


Estimated GFR  > 60 ml/min  10/16/19  19:44    


 


BUN/Creatinine Ratio  10 %  10/16/19  19:44    


 


Glucose  83 mg/dL ()   10/16/19  19:44    


 


Lactic Acid  1.00 mmol/L (0.7-2.0)   10/16/19  19:44    


 


Calcium  9.4 mg/dL (8.4-10.2)   10/16/19  19:44    


 


Total Bilirubin  0.60 mg/dL (0.1-1.2)   10/16/19  19:44    


 


AST  43 units/L (5-40)  H  10/16/19  19:44    


 


ALT  19 units/L (7-56)   10/16/19  19:44    


 


Alkaline Phosphatase  71 units/L ()   10/16/19  19:44    


 


Troponin T  < 0.010 ng/mL (0.00-0.029)   10/16/19  19:44    


 


Total Protein  7.6 g/dL (6.3-8.2)   10/16/19  19:44    


 


Albumin  4.6 g/dL (3.9-5)   10/16/19  19:44    


 


Albumin/Globulin Ratio  1.5 %  10/16/19  19:44    


 


Urine Color  Colorless  (Yellow)   10/16/19  19:23    


 


Urine Turbidity  Clear  (Clear)   10/16/19  19:23    


 


Urine pH  7.0  (5.0-7.0)   10/16/19  19:23    


 


Ur Specific Gravity  1.001  (1.003-1.030)  L  10/16/19  19:23    


 


Urine Protein  <15 mg/dl mg/dL (Negative)   10/16/19  19:23    


 


Urine Glucose (UA)  Neg mg/dL (Negative)   10/16/19  19:23    


 


Urine Ketones  Neg mg/dL (Negative)   10/16/19  19:23    


 


Urine Blood  Neg  (Negative)   10/16/19  19:23    


 


Urine Nitrite  Neg  (Negative)   10/16/19  19:23    


 


Urine Bilirubin  Neg  (Negative)   10/16/19  19:23    


 


Urine Urobilinogen  < 2.0 mg/dL (<2.0)   10/16/19  19:23    


 


Ur Leukocyte Esterase  Neg  (Negative)   10/16/19  19:23    


 


Urine WBC (Auto)  < 1.0 /HPF (0.0-6.0)   10/16/19  19:23    


 


Urine RBC (Auto)  < 1.0 /HPF (0.0-6.0)   10/16/19  19:23    


 


Salicylates  < 0.3 mg/dL (2.8-20.0)  L  10/16/19  19:44    


 


Urine Opiates Screen  Presumptive negative   10/16/19  Unknown


 


Urine Methadone Screen  Presumptive negative   10/16/19  Unknown


 


Acetaminophen  < 5.0 ug/mL (10.0-30.0)  L  10/16/19  19:44    


 


Ur Barbiturates Screen  Presumptive negative   10/16/19  Unknown


 


Ur Phencyclidine Scrn  Presumptive negative   10/16/19  Unknown


 


Ur Amphetamines Screen  Presumptive negative   10/16/19  Unknown


 


U Benzodiazepines Scrn  Presumptive negative   10/16/19  Unknown


 


Urine Cocaine Screen  Presumptive negative   10/16/19  Unknown


 


U Marijuana (THC) Screen  Presumptive positive   10/16/19  Unknown


 


Drugs of Abuse Note  Disclamer   10/16/19  Unknown


 


Plasma/Serum Alcohol  < 0.01 % (0-0.07)   10/16/19  19:44    














Active Medications





- Current Medications


Current Medications: 














Generic Name Dose Route Start Last Admin





  Trade Name Freq  PRN Reason Stop Dose Admin


 


Acetaminophen  650 mg  10/17/19 00:50 





  Tylenol  PO  





  Q4H PRN  





  Headache  


 


Albuterol  2.5 mg  10/17/19 01:01 





  Proventil  IH  





  Q4HRT PRN  





  Shortness Of Breath  


 


Amlodipine Besylate  5 mg  10/17/19 10:00  10/17/19 10:04





  Norvasc  PO   5 mg





  DAILY BRIGID   Administration


 


Arformoterol Tartrate  15 mcg  10/17/19 08:00  10/17/19 08:25





  Brovana Nebu  IH   15 mcg





  Q12HRT BRIGID   Administration


 


Aspirin  81 mg  10/17/19 10:00  10/17/19 10:04





  Halfprin Ec  PO   81 mg





  QDAY BRIGID   Administration


 


Budesonide  0.5 mg  10/17/19 08:00  10/17/19 08:25





  Pulmicort  IH   0.5 mg





  Q12HRT BRIGID   Administration


 


Cyclobenzaprine HCl  10 mg  10/17/19 00:58  10/17/19 10:04





  Flexeril  PO   10 mg





  TID PRN   Administration





  Spasms  


 


Lisinopril  20 mg  10/17/19 10:00  10/17/19 10:04





  Zestril  PO   20 mg





  QDAY BRIGID   Administration


 


Miscellaneous Medication  10 mg  10/17/19 01:00 





  Prednisone [Prednisone 10 Mg (6-Day Pack, 21 Tabs)]  PO  





  .TAPER BRIGID  


 


Morphine Sulfate  2 mg  10/17/19 00:49  10/17/19 15:39





  Morphine  IV   2 mg





  Q3H PRN   Administration





  Pain, Moderate (4-6)  


 


Nicotine  21 mg  10/17/19 10:00  10/17/19 10:04





  Habitrol  TD   21 mg





  DAILY BRIGID   Administration


 


Ondansetron HCl  4 mg  10/17/19 00:49 





  Zofran  IV  





  Q8H PRN  





  Nausea And Vomiting  


 


Pantoprazole Sodium  20 mg  10/17/19 10:00  10/17/19 10:04





  Protonix  PO   20 mg





  BID BRIGID   Administration














Nutrition/Malnutrition Assess





- Dietary Evaluation


Nutrition/Malnutrition Findings: 


                                        





Nutrition Notes                                            Start:  10/17/19 

11:01


Freq:                                                      Status: Active       




Protocol:                                                                       




 Document     10/17/19 11:01  CC  (Rec: 10/17/19 12:11  CC  [id number])


 Co-Sign      10/17/19 11:01  LP


 Nutrition Notes


     Need for Assessment generated from:         MST


     Initial or Follow up                        Assessment


     Current Diagnosis                           Coronary Artery Disease,


                                                 Hypertension


     Other Pertinent Diagnosis                   GERD, dysphagia


     Current Diet                                low sodium diet


     Labs/Tests                                  BUN 7, Creat 0.7, AST 43


     Pertinent Medications                       reviewed


     Height                                      5 ft 8 in


     Weight                                      60.4 kg


     Usual Body Weight                           75 kg


     Ideal Body Weight (kg)                      70.00


     BMI                                         20.2


     Intake Prior to Admission                   Poor


     Weight change and time frame                19.4% unknown time frame


     Weight Status                               Appropriate


     Subjective/Other Information                Screened for MST score. Pt


                                                 stated he had a poor intake


                                                 PTA and had little appetite.


                                                 Pt stated his UBW is 165lbs


                                                 and has been losing weight for


                                                 awhile but unsure of when


                                                 this started. Pt stated he is


                                                 not in a great situation and


                                                 would like to gain back weight


                                                 because he feels weak. Pt


                                                 stated he has difficulty


                                                 swallowing some foods and


                                                 would like to try a pureed


                                                 diet as well as Ensure TID. Pt


                                                 had moderate muscle and fat


                                                 wasting.


     Burn                                        Absent


     Trauma                                      Absent


     GI Symptoms                                 None


     Difficulty In                               Swallowing


     Food Allergy                                No


     Current % PO                                Good (%)


     Minimum of two criteria                     Yes


     Body Fat Depletion                          Moderate depletion (severe)


     Muscle Mass                                 Moderate Depletion (severe)


     #1


      Nutrition Diagnosis                        Malnutrition


      Etiology                                   poor appetite


      As Evidenced by Signs and Symptoms         moderate fat wasting, moderate


                                                 muscle wasting


     Is patient on ventilator?                   No


     Is Patient Ambulatory and/or Out of Bed     No


     REE-([facility]-confined to bed)      [phone]


     Kcal/Kg value to use for calculation        36


     Approximate Energy Requirements Using       2174


      kcal/Kg                                    


     Calculation Used for Recommendations        Kcal/kg


     Additional Notes                            PRO: 72-91g/day (1.2-1.5g/kg)


                                                 Fluid: 1ml/kcal


 Nutrition Intervention


     Change Diet Order:                          pureed diet, Ensure Enlive TID


     Add Supplement/Snack (indicate name/kcal    Ensure Enlive TID


      /protein )                                 


     Provides kCal:                              1,050


     Provides Protein (gm)                       60


     Goal #1                                     Meet at least 80% of energy


                                                 and protein needs through po


                                                 and ONS


     Goal #2                                     F/U tolerance to pureed diet


     Anticipated Discharge Needs:                pureed diet and ONS


     Follow-Up By:                               10/21/19


     Additional Comments                         F/U po, ONS intake and diet


                                                 tolerance

## 2019-10-17 NOTE — CONSULTATION
History of Present Illness


Consult date: 10/17/19


Requesting physician: RENU GIANG


Reason for consult: pneumothorax


History of present illness: 





60 yo who fell and banged his L chest, found to have L rib fracture and small 

PTX. Has some mild pain on that side with breathing/movement. Mainly is c/o R 

hip/leg pain. SOB overall stable. No fevers, chills, hemoptysis.





Active Medications





Acetaminophen (Tylenol)  650 mg PO Q4H PRN


   PRN Reason: Headache


Albuterol (Proventil)  2.5 mg IH Q4HRT PRN


   PRN Reason: Shortness Of Breath


Amlodipine Besylate (Norvasc)  5 mg PO DAILY [facility]


   Last Admin: 10/17/19 10:04 Dose:  5 mg


   Documented by: 


Arformoterol Tartrate (Brovana Nebu)  15 mcg IH Q12HRT [facility]


   Last Admin: 10/17/19 08:25 Dose:  15 mcg


   Documented by: 


Aspirin (Halfprin Ec)  81 mg PO QDAY [facility]


   Last Admin: 10/17/19 10:04 Dose:  81 mg


   Documented by: 


Budesonide (Pulmicort)  0.5 mg IH Q12HRT [facility]


   Last Admin: 10/17/19 08:25 Dose:  0.5 mg


   Documented by: 


Cyclobenzaprine HCl (Flexeril)  10 mg PO TID PRN


   PRN Reason: Spasms


   Last Admin: 10/17/19 10:04 Dose:  10 mg


   Documented by: 


Lisinopril (Zestril)  20 mg PO QDAY [facility]


   Last Admin: 10/17/19 10:04 Dose:  20 mg


   Documented by: 


Miscellaneous Medication (Prednisone [Prednisone 10 Mg (6-Day Pack, 21 Tabs)])  

10 mg PO .TAPER [facility]


Nicotine (Habitrol)  21 mg TD DAILY [facility]


   Last Admin: 10/17/19 10:04 Dose:  21 mg


   Documented by: 


Ondansetron HCl (Zofran)  4 mg IV Q8H PRN


   PRN Reason: Nausea And Vomiting


Oxycodone/Acetaminophen (Percocet 5/325)  1 tab PO Q6H PRN


   PRN Reason: Pain, Moderate (4-6)


   Last Admin: 10/17/19 16:38 Dose:  1 tab


   Documented by: 


Pantoprazole Sodium (Protonix)  20 mg PO BID [facility]


   Last Admin: 10/17/19 10:04 Dose:  20 mg


   Documented by: 











Past History


Past Medical History: other (COPD, GERD, Chronic tobacco abuse)


Social history: smoking, full code.  denies: alcohol abuse, prescription drug 

abuse, IV drug use


Family history: other (No pulm issues reported)





Medications and Allergies


                                    Allergies











Allergy/AdvReac Type Severity Reaction Status Date / Time


 


No Known Allergies Allergy   Verified 09/12/18 09:11











                                Home Medications











 Medication  Instructions  Recorded  Confirmed  Last Taken  Type


 


RX: Omeprazole [PriLOSEC] 20 mg PO QDAY #30 capsule. 06/11/15 05/22/17 

09/18/17 Rx


 


Prednisone [predniSONE 5 mg (6-Day 5 mg PO .TAPER #1 tab.ds.pk 05/25/17 09/18/17 Rx





Pack, 21 Tabs)]     


 


RX: Acetaminophen/Codeine [Tylenol 1 tab PO Q6H PRN #14 tablet 05/25/17 09/18/17 Rx





/Codeine # 3 tab]     


 


RX: Albuterol Sulfate [Ventolin 2 puff IH Q4H PRN #1 hfa.aer.ad 05/25/17 09/18/17 Rx





HFA]     


 


RX: Aspirin EC [Halfprin EC] 81 mg PO QDAY #30 tablet 05/25/17 09/18/17 Rx


 


RX: Baclofen [Lioresal] 20 mg PO BID #30 tablet 05/25/17 09/18/17 Rx


 


RX: Cyclobenzaprine [Flexeril 10 10 mg PO Q8H PRN #14 tablet 05/25/17 09/18/17 

Rx





MG TAB]     


 


RX: Fluticasone [Flonase] 1 spray NS QDAY #1 bottle 05/25/17 09/18/17 Rx


 


RX: Fluticasone/Salmeterol [Advair 1 each IH DAILY #1 disk.w.dev 05/25/17 09/ 18/17 Rx





Diskus 250-50 mcg]     


 


RX: Lisinopril [Zestril TAB] 20 mg PO QDAY #30 tablet 05/25/17 09/18/17 Rx


 


RX: Pantoprazole [Protonix TAB] 20 mg PO QDAY #30 tablet. 05/25/17 09/18/17 

Rx


 


RX: Polyethylene Glycol 3350 17 gm PO QDAY PRN #30 powd.pack 05/25/17 09/18/17 

Rx





[Miralax 3350]     


 


RX: amLODIPine [Norvasc] 5 mg PO DAILY #30 tablet 05/25/17 09/18/17 Rx


 


Amoxicillin/K Clav Tab [Augmentin 1 tab PO Q12HR #14 tab 09/18/17  Unknown Rx





875 mg]     


 


HYDROcodone/APAP 5-325 [[place] 1 each PO Q6HR PRN #20 tablet 09/18/17  Unknown Rx





5/325]     


 


Ibuprofen [Motrin] 600 mg PO Q8H PRN #30 tablet 09/18/17  Unknown Rx


 


RX: ALBUTEROL Inhaler (OR & NICU) 2 puff IH QID PRN #1 inhalation 09/18/17  

Unknown Rx





[ProAir HFA Inhaler]     


 


Sodium Chloride [Saline Nasal 1 - 2 spray NS PRN PRN #1 bottle 09/18/17  Unknown

 Rx





Spray]     


 


RX: Lansoprazole [Prevacid] 15 mg PO BID #60 cap 05/20/18  Unknown Rx


 


RX: traMADol [Ultram 50 MG tab] 50 mg PO Q6HR PRN #20 tablet 05/20/18  Unknown 

Rx


 


RX: Ketorolac [Toradol] 10 mg PO Q6H PRN #20 tablet 07/29/18  Unknown Rx


 


Ibuprofen [Motrin] 800 mg PO Q8HR PRN #20 tablet 09/12/18  Unknown Rx


 


methOCARBAMOL [Robaxin TAB] 500 mg PO Q6H PRN #15 tablet 09/12/18  Unknown Rx


 


traMADol [Ultram] 50 mg PO Q6HR PRN #12 tablet 09/12/18  Unknown Rx


 


Benzonatate [Tessalon Perle] 100 mg PO Q8H PRN #20 capsule 01/13/19  Unknown Rx


 


Ibuprofen [Motrin] 600 mg PO Q8H PRN #20 tablet 01/13/19  Unknown Rx


 


Prednisone [predniSONE 10 mg 10 mg PO .TAPER #1 tab.ds.pk 01/13/19  Unknown Rx





(6-Day Pack, 21 Tabs)]     


 


RX: ALBUTEROL Inhaler(NF) 1 puff IH Q4-6H PRN #1 inha 01/13/19  Unknown Rx





[VENTOLIN Inhaler(NF)]     


 


RX: Azithromycin [Zithromax Z-MISHEL] 250 mg PO DAILY #6 tablet 01/13/19  Unknown 

Rx


 


Cyclobenzaprine [Flexeril] 10 mg PO TID PRN #30 tablet 04/19/19  Unknown Rx


 


Menthol/Camphor [Tiger Balm 1 applicatio TP QID PRN #1 tube 04/19/19  Unknown Rx





Ointment]     


 


RX: Naproxen 500 mg PO BID #30 tablet 04/19/19  Unknown Rx


 


Albuterol Sulfate [Proair 90 mcg IH Q4HR PRN #2 aer.pow.ba 08/08/19  Unknown Rx





Respiclick]     


 


Ibuprofen [Motrin] 600 mg PO Q8H PRN #30 tablet 08/08/19  Unknown Rx


 


RX: Acetaminophen [Non-Aspirin 500 mg PO Q6HR PRN #30 tablet 08/08/19  Unknown 

Rx





Extra Strength]     


 


RX: Chlorhexidine Mouthwash 15 ml MM BID #1 bottle 08/08/19  Unknown Rx





[Peridex]     


 


RX: Nicotine [Habitrol] 21 mg TD DAILY #30 patch 08/08/19  Unknown Rx











Active Meds: 


Active Medications





Acetaminophen (Tylenol)  650 mg PO Q4H PRN


   PRN Reason: Headache


Albuterol (Proventil)  2.5 mg IH Q4HRT PRN


   PRN Reason: Shortness Of Breath


Amlodipine Besylate (Norvasc)  5 mg PO DAILY [facility]


   Last Admin: 10/17/19 10:04 Dose:  5 mg


   Documented by: 


Arformoterol Tartrate (Brovana Nebu)  15 mcg IH Q12HRT [facility]


   Last Admin: 10/17/19 08:25 Dose:  15 mcg


   Documented by: 


Aspirin (Halfprin Ec)  81 mg PO QDAY [facility]


   Last Admin: 10/17/19 10:04 Dose:  81 mg


   Documented by: 


Budesonide (Pulmicort)  0.5 mg IH Q12HRT [facility]


   Last Admin: 10/17/19 08:25 Dose:  0.5 mg


   Documented by: 


Cyclobenzaprine HCl (Flexeril)  10 mg PO TID PRN


   PRN Reason: Spasms


   Last Admin: 10/17/19 10:04 Dose:  10 mg


   Documented by: 


Lisinopril (Zestril)  20 mg PO QDAY [facility]


   Last Admin: 10/17/19 10:04 Dose:  20 mg


   Documented by: 


Miscellaneous Medication (Prednisone [Prednisone 10 Mg (6-Day Pack, 21 Tabs)])  

10 mg PO .TAPER BRIGID


Morphine Sulfate (Morphine)  2 mg IV Q3H PRN


   PRN Reason: Pain, Moderate (4-6)


   Last Admin: 10/17/19 06:49 Dose:  2 mg


   Documented by: 


Nicotine (Habitrol)  21 mg TD DAILY [facility]


   Last Admin: 10/17/19 10:04 Dose:  21 mg


   Documented by: 


Ondansetron HCl (Zofran)  4 mg IV Q8H PRN


   PRN Reason: Nausea And Vomiting


Pantoprazole Sodium (Protonix)  20 mg PO BID [facility]


   Last Admin: 10/17/19 10:04 Dose:  20 mg


   Documented by: 











Review of Systems


All systems: negative





Physical Examination


Vital signs: 


                                   Vital Signs











Temp Pulse Resp BP Pulse Ox


 


 98.2 F   78   18   160/82   97 


 


 10/16/19 19:01  10/16/19 19:01  10/16/19 19:01  10/16/19 19:01  10/16/19 19:01











General appearance: no acute distress, alert


Eyes: non-icteric


ENT: oropharynx moist


Neck: supple


Effort: normal


Ascultation: Bilateral: clear


Cardiovascular: regular rate and rhythm (no mrg)


Gastrointestinal: normoactive bowel sounds, soft, non-tender, non-distended


Integumentary: normal


Extremities: no cyanosis


Musculoskeletal: no deformities


normal mental status, non-focal exam, pupils equal and round


mood appropriate, affect normal





Results





- Laboratory Findings


CBC and BMP: 


                                 10/16/19 19:44





                                 10/16/19 19:44


PT/INR, D-dimer











PT  17.6 Sec. (12.2-14.9)  H  10/16/19  19:44    


 


INR  1.48  (0.87-1.13)  H  10/16/19  19:44    








Abnormal lab findings: 


                                  Abnormal Labs











  10/16/19 10/16/19 10/16/19





  19:23 19:44 19:44


 


WBC   11.2 H 


 


Seg Neutrophils #   7.9 H 


 


PT   


 


INR   


 


Carbon Dioxide    19 L


 


BUN    7 L


 


Creatinine    0.7 L


 


AST    43 H


 


Ur Specific Gravity  1.001 L  


 


Salicylates   


 


Acetaminophen   














  10/16/19 10/16/19 10/16/19





  19:44 19:44 19:44


 


WBC   


 


Seg Neutrophils #   


 


PT  17.6 H  


 


INR  1.48 H  


 


Carbon Dioxide   


 


BUN   


 


Creatinine   


 


AST   


 


Ur Specific Gravity   


 


Salicylates   < 0.3 L 


 


Acetaminophen    < 5.0 L














- Diagnostic Findings


Chest x-ray: report reviewed, image reviewed


CT scan - chest: report reviewed, image reviewed





Assessment and Plan





Imp:


1. L rib fracture and tiny L apical PTX s/p fall


2. COPD


3. Chronic nicotine dependence, cigarettes


4. Anion-gap metabolic acidosis





Rec:


1. CXR clear today; do not believe the PTX is significant; can go home pulm-wise

and can repeat CXR PRN worsening chest pain


2. Incentive spirometry


3. Pain control


4. Repeat BMP in AM


5. Stop smoking





Plan of care reviewed w/ patient, he understands/agrees


Thanks kindly for the consult.

## 2019-10-17 NOTE — XRAY REPORT
RIGHT HIP, 2 VIEWS



INDICATION:  5/22/2017. 



COMPARISON: None.



IMPRESSION:  Normal bone mineralization.  Severe end-stage osteoarthritis is identified at the right 
hip. There is complete loss of the superior joint space, articular surface sclerosis and osteophytic 
spurring. There appears to be a large degenerative cyst in the lateral femoral head measuring up to 4
 cm. No evidence for acute fracture or malalignment. The visualized pelvis is intact. Degenerative ch
anges appear slightly advanced since the previous exam.



Signer Name: Costa Tucker Jr, MD 

Signed: 10/17/2019 3:46 PM

 Workstation Name: [id number]

## 2019-10-17 NOTE — XRAY REPORT
CHEST 1 VIEW 



INDICATION:  PNEUMOTHORAX.



COMPARISON:  10/16/2019



FINDINGS:

Support devices: None. 



Heart: Within normal limits. 

Lungs/Pleura: No acute air space or interstitial disease. 



Additional findings: None.



IMPRESSION:

 No acute findings. The previously described small left pneumothorax seen on recent chest CT is not d
emonstrated on portable chest.



Signer Name: Costa Tucker Jr, MD 

Signed: 10/17/2019 10:23 AM

 Workstation Name: [id number]

## 2019-10-17 NOTE — XRAY REPORT
RIGHT KNEE, 3 VIEWS



INDICATION:  pain. 



COMPARISON: None.



IMPRESSION:  Normal bone mineralization. No evidence for fracture or bone lesion. Minimal retropatell
ar spurring is noted. Trace joint effusion is suspected on the lateral view.  No acute process is not
ed.



Signer Name: Costa Tucker Jr, MD 

Signed: 10/17/2019 3:02 PM

 Workstation Name: [id number]

## 2019-10-17 NOTE — HISTORY AND PHYSICAL REPORT
CHIEF COMPLAINT:  Fall with injury to the head and the chest.



HISTORY OF PRESENT ILLNESS:  The patient is a 59-year-old male who fell

yesterday morning.  The patient said he was walking up the hill and tripped and

fell in the morning.  There was questionable history of loss of consciousness

and the patient had bruises to the head and face and then reports worsening of

his chronic cough in the afternoon after falling down.  There was also history

of left-sided chest wall pain, which is worse with breathing and cough.  There

was no history of fever or chills and no history of ingestion of alcohol or drug

use prior to the fall.  The patient also denied history of dizziness.



PAST MEDICAL HISTORY:  Pertinent for hypertension, coronary artery disease,

gastroesophageal reflux disease, arthritis, asthma and also past medical history

of prostate problems, right hip injury and dysphagia as well as pancreatitis and

chronic back pain.



PAST SURGICAL HISTORY:  Pertinent for surgery to the thumb, herniorrhaphy, left

knee surgery, inguinal hernia repair.



FAMILY HISTORY:  Family history is noncontributory.



SOCIAL HISTORY:  The patient does not smoke, does not drink alcohol and does not

use illicit drugs.



MEDICATIONS:  The patient is on multiple medications that include omeprazole 20

mg by mouth daily, Tylenol No. 3 one by mouth every 6 hours as needed for pain. 

Also, the patient is on albuterol inhaler 2 puffs every 4 hours as needed for

shortness of breath, enteric-coated aspirin 81 mg by mouth daily.  Also, the

patient is on baclofen 20 mg by mouth twice daily and on Flexeril 10 mg by mouth

every 8 hours as needed for muscle spasm.  The patient is also on Advair Diskus

250/50 mcg 1 by inhalation daily.  The patient is on lisinopril 20 mg daily,

pantoprazole 20 mg daily, polyethylene glycol 17 grams p.o. daily.  The patient

is on prednisone pack 5 mg, which is not certain whether the patient has

finished the 21 tablets prednisone pack.  The patient is also on amlodipine 5 mg

by mouth daily and Norco 5/325 mg by mouth every 6 hours as needed for pain. 

Also, the patient is on Motrin 600 mg by mouth every 8 hours as needed for pain,

and saline nasal spray 1 spray nasally every day as needed for stuffiness. 

Also, the patient is on Prevacid 15 mg by mouth daily, tramadol 50 mg by mouth

every 6 hours, Toradol 10 mg by mouth every 6 hours and the patient is on

Tessalon Perles or benzonatate 100 mg by mouth every 8 hours as needed for cough

and the patient uses Tiger Balm palm which is applied topically q.i.d.  The

patient is also on naproxen 500 mg by mouth twice daily and on chlorhexidine

mouthwash 15 mL twice daily.  The patient is on nicotine patch 21 mg

transdermally daily.



ALLERGIES:  There are no known drug allergies.



REVIEW OF SYSTEMS:

CONSTITUTIONAL:  There is no fever, no chills, no diaphoresis.

HEENT:  There is headache and pain in the face, but no sore throat.

CARDIOVASCULAR SYSTEM:  Left-sided pleuritic chest pain noted.  No orthopnea.

RESPIRATORY SYSTEM:  Shortness of breath is noted and there is history of cough.

GASTROINTESTINAL SYSTEM:  There is no nausea, no vomiting, no abdominal pain,

diarrhea or constipation.

NEUROLOGICAL SYSTEM:  There is no numbness, no dizziness, no altered mental

status.

MUSCULOSKELETAL SYSTEM:  There is no joint swelling, but there is pain in the

facial area.  There is also pain in the left lateral chest wall area.

DERMATOLOGICAL SYSTEM:  Bruises on the face and head area and upper limbs noted.

 No itching.

GENITOURINARY SYSTEM:  There is no dysuria or hematuria.

Rest of system review is normal.



PHYSICAL EXAMINATION:

GENERAL:  At the time of exam, the patient was found to be alert, oriented x 3

and in mild distress due to pain in the left lateral chest wall area.

VITAL SIGNS:  Shows temperature of 98.2 degrees Fahrenheit, pulse of 78,

respirations 18, blood pressure 160/82, O2 sat of 97% on room air with a repeat

blood pressure after about 5 hours showing 139/91.

HEENT:  Showed pupils to be equal, round, reactive to light and accommodation. 

There are bruises all over the face area from where the patient fell on the

ground.

NECK:  The patient's neck is supple with no JVD or carotid bruit.

CARDIOVASCULAR SYSTEM:  Showed normal first and second heart sounds with no

gallops or murmurs.

RESPIRATORY SYSTEM:  Showed reduced air entry on the left lung field area with

no abnormal breath sounds and no evidence of use of accessory respiratory

muscles.

GASTROINTESTINAL SYSTEM:  Abdomen is full, soft, nontender with no organomegaly

or rigidity.

NEUROLOGIC:  Shows no focal deficit.

MUSCULOSKELETAL SYSTEM:  Shows tenderness in the left lateral chest wall area

and anterior chest wall area.  There is no joint swelling.

DERMATOLOGICAL SYSTEM:  Show bruises in the face and head area with also bruises

in the upper limbs, most likely from the patient's fall.

GENITOURINARY SYSTEM:  There is no costovertebral angle tenderness.



PERTINENT LABORATORY AND IMAGING STUDIES:  The patient had CT of the cervical

spine done and this shows small left apical pneumothorax with widespread

cervical spondylosis.  The patient also has CT of the face done that shows soft

tissue swelling in the left forehead and superior orbital rim extending down to

involve the upper eyelid.  There is no indication of facial fracture according

to the radiologist.  Also, the patient has CT of the head without contrast done

that shows no intracranial abnormality on head CT.  The patient had chest x-ray

done that shows no significant change.  The patient also had chest CT without

contrast done that shows left anterior rib fracture with small left

pneumothorax.  Lab results, the patient had CBC done with elevated white count

of 11,200 with normal hemoglobin, normal hematocrit with CBC differential

showing slightly elevated segmented neutrophil count.  The patient's coagulation

studies show slight increase in PT of 17.6 with slight increase in INR of 1.4

and the patient's chemistry was unremarkable except for slight decrease in CO2

of 19 and unremarkable renal function test.  The patient's liver transaminases

show high AST of 43 with normal ALT, and rest of chemistry including troponin

levels were unremarkable.  Urinalysis, the patient had urinalysis done that

shows colorless, clear urine with low specific gravity of 1.001.  The patient's

toxicology screen is positive for marijuana.  The patient's blood alcohol level

was unremarkable.



DIAGNOSES:

1.  Left lung pneumothorax.

2.  Left 3rd and 4th anterior rib fracture.

3.  Substance abuse, notably marijuana.

4.  Atypical chest pain.



PLAN OF CARE:

1.  The patient will be placed on observation on telemetry.

2.  The patient will continue pulmonary consult with Dr. Scout Renee

requested already by the Emergency Room physician with the pulmonologist already

notified of the patient's condition on admission.

3.  The patient will continue 100% nonrebreather oxygen treatment as requested

by the pulmonologist.

4.  The patient will be on his home medication as shown in the medication

reconciliation section.

5.  The patient will be on IV morphine 2 mg every 3 hours as needed for pain and

IV Zofran 4 mg every 8 hours as needed for nausea and vomiting.

6.  The patient's diet will be 2 g sodium, low salt diet.





DD: 10/17/2019 05:08

DT: 10/17/2019 05:42

JOB# [id number]  [id number]

OCN/NTS

## 2019-10-18 VITALS — DIASTOLIC BLOOD PRESSURE: 90 MMHG | SYSTOLIC BLOOD PRESSURE: 150 MMHG

## 2019-10-18 LAB
BUN SERPL-MCNC: 6 MG/DL (ref 9–20)
BUN/CREAT SERPL: 9 %
CALCIUM SERPL-MCNC: 8.8 MG/DL (ref 8.4–10.2)
HEMOLYSIS INDEX: 7

## 2019-10-18 RX ADMIN — OXYCODONE AND ACETAMINOPHEN PRN TAB: 5; 325 TABLET ORAL at 04:29

## 2019-10-18 RX ADMIN — ASPIRIN SCH MG: 81 TABLET, COATED ORAL at 09:51

## 2019-10-18 RX ADMIN — LISINOPRIL SCH MG: 20 TABLET ORAL at 09:52

## 2019-10-18 RX ADMIN — BUDESONIDE SCH MG: 0.5 INHALANT RESPIRATORY (INHALATION) at 07:50

## 2019-10-18 RX ADMIN — AMLODIPINE BESYLATE SCH MG: 5 TABLET ORAL at 09:51

## 2019-10-18 RX ADMIN — ARFORMOTEROL TARTRATE SCH MCG: 15 SOLUTION RESPIRATORY (INHALATION) at 07:50

## 2019-10-18 RX ADMIN — NICOTINE SCH MG: 21 PATCH TRANSDERMAL at 09:51

## 2019-10-18 RX ADMIN — OXYCODONE AND ACETAMINOPHEN PRN TAB: 5; 325 TABLET ORAL at 10:37

## 2019-10-18 RX ADMIN — PANTOPRAZOLE SODIUM SCH MG: 20 TABLET, DELAYED RELEASE ORAL at 09:51

## 2019-10-18 NOTE — PROGRESS NOTE
Assessment and Plan





Imp:


1. L rib fracture and tiny L apical PTX s/p fall


2. COPD


3. Chronic nicotine dependence, cigarettes


4. Anion-gap metabolic acidosis, resolved





Rec:


1. CXR clear 10/17/19; do not believe the PTX is significant; can go home pulm-

wise and can repeat CXR PRN worsening chest pain


2. Incentive spirometry


3. Pain control


4. Stop smoking


5. Advised to see Dr. Butterfield in 1-2 weeks





Plan of care reviewed w/ patient, he understands/agrees








Subjective


Date of service: 10/18/19


Principal diagnosis: COPD, PTX


Interval history: 


No events. SOB near baseline. Minimal chest pain. Mainly c/o hip pain. Currently

on RA.











Objective


                               Vital Signs - 12hr











  10/18/19 10/18/19 10/18/19





  07:46 07:51 07:52


 


Temperature 98.9 F  


 


Pulse Rate   


 


Pulse Rate [  85 





Anterior   





Bilateral   





Throughout]   


 


Pulse Rate [   





Left Radial]   


 


Pulse Rate [   





Right Radial]   


 


Respiratory 18  





Rate   


 


Respiratory  18 





Rate [Anterior   





Bilateral   





Throughout]   


 


Blood Pressure 150/90  


 


O2 Sat by Pulse   95





Oximetry   














  10/18/19 10/18/19 10/18/19





  09:51 09:52 10:00


 


Temperature   


 


Pulse Rate 80 80 78


 


Pulse Rate [   





Anterior   





Bilateral   





Throughout]   


 


Pulse Rate [   





Left Radial]   


 


Pulse Rate [   





Right Radial]   


 


Respiratory   





Rate   


 


Respiratory   





Rate [Anterior   





Bilateral   





Throughout]   


 


Blood Pressure 150/90 150/90 


 


O2 Sat by Pulse   





Oximetry   














  10/18/19





  11:15


 


Temperature 


 


Pulse Rate 


 


Pulse Rate [ 





Anterior 





Bilateral 





Throughout] 


 


Pulse Rate [ 80





Left Radial] 


 


Pulse Rate [ 80





Right Radial] 


 


Respiratory 18





Rate 


 


Respiratory 





Rate [Anterior 





Bilateral 





Throughout] 


 


Blood Pressure 


 


O2 Sat by Pulse 





Oximetry 











Constitutional: no acute distress, alert


Eyes: non-icteric


ENT: oropharynx moist


Neck: supple


Effort: normal


Ascultation: Bilateral: clear


Cardiovascular: regular rate and rhythm (no mrg)


Gastrointestinal: normoactive bowel sounds, soft, non-tender, non-distended


Integumentary: normal


Extremities: no cyanosis


Neurologic: normal mental status, non-focal exam, pupils equal and round


Psychiatric: mood appropriate, affect normal


CBC and BMP: 


                                 10/16/19 19:44





                                 10/18/19 05:18


ABG, PT/INR, D-dimer: 


PT/INR, D-dimer











PT  17.6 Sec. (12.2-14.9)  H  10/16/19  19:44    


 


INR  1.48  (0.87-1.13)  H  10/16/19  19:44    








Abnormal lab findings: 


                                  Abnormal Labs











  10/16/19 10/16/19 10/16/19





  19:23 19:44 19:44


 


WBC   11.2 H 


 


Seg Neutrophils #   7.9 H 


 


PT   


 


INR   


 


Carbon Dioxide    19 L


 


BUN    7 L


 


Creatinine    0.7 L


 


Glucose   


 


AST    43 H


 


Ur Specific Gravity  1.001 L  


 


Salicylates   


 


Acetaminophen   














  10/16/19 10/16/19 10/16/19





  19:44 19:44 19:44


 


WBC   


 


Seg Neutrophils #   


 


PT  17.6 H  


 


INR  1.48 H  


 


Carbon Dioxide   


 


BUN   


 


Creatinine   


 


Glucose   


 


AST   


 


Ur Specific Gravity   


 


Salicylates   < 0.3 L 


 


Acetaminophen    < 5.0 L














  10/18/19





  05:18


 


WBC 


 


Seg Neutrophils # 


 


PT 


 


INR 


 


Carbon Dioxide 


 


BUN  6 L


 


Creatinine  0.7 L


 


Glucose  133 H


 


AST 


 


Ur Specific Gravity 


 


Salicylates 


 


Acetaminophen 











Chest x-ray: report reviewed, image reviewed


CT scan - chest: report reviewed, image reviewed

## 2019-10-18 NOTE — DISCHARGE SUMMARY
Providers





- Providers


Date of Admission: 


10/16/19 22:51





Attending physician: 


RENU GIANG MD





                                        





10/16/19 22:40


Consult to Physician [CONS] Stat 


   Comment: Dr. Tejeda spoke with Dr. Butterfield @ [phone]


   Consulting Provider: KEVIN BUTTERFIELD


   Physician Instructions: 


   Reason For Exam: pneumothorax





10/17/19 05:09


Speech Therapy Evaluation and Treat [CONS] Routine 


   Reason For Exam: Intolerance to solid foods. Poor dentition.





10/17/19 05:12


Physical Therapy Evaluation and Treat [CONS] Routine 


   Comment: 


   Reason For Exam: Status post fall. Generalized weakness.





10/17/19 16:09


Consult to Physician [CONS] Routine 


   Comment: 


   Consulting Provider: EVAN KOWALSKI


   Physician Instructions: 


   Reason For Exam: right hip severe degenerative disease











Primary care physician: 


PRIMARY CARE MD








Hospitalization


Condition: Stable


Hospital course: 


Patient is a 58 yo male with hx of HTN, CAD, GERD, ARTHRITIS, ASTHMA AND CHRONIC

 RIGHT HIP INJURY, CHRONIC BACK PAIN, Admitted following a fall and trauma to L 

chest, found to have L rib fracture and small PTX. Has some mild pain on that 

side with breathing/movement. 


No reports of chills, or hemptsis, Right hip pain per patient was aggravated 

following the fall.











Small Left Pneumothroax


Degenerative Joint disease


Left 3rd, 4th anterior rib fracture


Subtance abuse-Marijuana


Pleuritic chest pain secondary to Rib fracture








Plan


PT/OT


Ortho consult


Pulmonary consult. Discussed with Dr Washington, also present in the room during his

 evaluation of the patient


Continue pain control


O2 as needed


Home meds


DVT.GI PROPHY


Anticipate discharge in am








Disposition: DC/TX-06 HOME UNDER HOME [facility]





Core Measure Documentation





- Palliative Care


Palliative Care/ Comfort Measures: Not Applicable





Exam





- Constitutional


Vitals: 


                                        











Temp Pulse Resp BP Pulse Ox


 


 98.9 F   80   18   150/90   95 


 


 10/18/19 07:46  10/18/19 11:15  10/18/19 11:15  10/18/19 09:52  10/18/19 07:52














Plan


Activity: advance as tolerated, fall precautions


Diet: low fat


Special Instructions: record daily BP diary, other (ETOH )


Care Plan Goals: 


RIGHT HIP SEVERITY ARTHRITIS- WILL BENEFIT FROM HIP REPLACEMENT 





Plan of Treatment: 


COMPLETELY HEAL FROM PNUMOTHORAX, AND FOLLOW WITH ORTHO FOR HIP REPLACEMENT


Follow up with: 


KEVIN BUTTERFIELD MD [Staff Physician] - 7 Days


PRIMARY CARE,MD [Primary Care Provider] - 3-5 Days


EVAN KOWALSKI MD [Staff Physician] - 7 Days


SOHA MARIEE MD [Staff Physician] - 7 Days


Prescriptions: 


HYDROcodone/APAP 5-325 [Norco 5-325 mg TAB] 1 each PO Q6HR PRN #14 tablet


 PRN Reason: Pain


Prednisone [predniSONE 5 mg (6-Day Pack, 21 Tabs)] 5 mg PO .TAPER #1 tab.ds.pk


methOCARBAMOL [Robaxin TAB] 500 mg PO Q6H PRN #15 tablet


 PRN Reason: Pain


Other Discharge Orders: 


Physicial Therapy (Amb) Location: None Selected

## 2020-06-30 ENCOUNTER — HOSPITAL ENCOUNTER (EMERGENCY)
Dept: HOSPITAL 5 - ED | Age: 60
Discharge: HOME | End: 2020-06-30
Payer: MEDICARE

## 2020-06-30 VITALS — DIASTOLIC BLOOD PRESSURE: 97 MMHG | SYSTOLIC BLOOD PRESSURE: 170 MMHG

## 2020-06-30 DIAGNOSIS — I25.2: ICD-10-CM

## 2020-06-30 DIAGNOSIS — M25.542: Primary | ICD-10-CM

## 2020-06-30 DIAGNOSIS — Z79.82: ICD-10-CM

## 2020-06-30 DIAGNOSIS — Z98.890: ICD-10-CM

## 2020-06-30 DIAGNOSIS — J44.9: ICD-10-CM

## 2020-06-30 DIAGNOSIS — Z79.1: ICD-10-CM

## 2020-06-30 DIAGNOSIS — Z79.899: ICD-10-CM

## 2020-06-30 DIAGNOSIS — K21.9: ICD-10-CM

## 2020-06-30 DIAGNOSIS — I10: ICD-10-CM

## 2020-06-30 DIAGNOSIS — F17.200: ICD-10-CM

## 2020-06-30 PROCEDURE — 99283 EMERGENCY DEPT VISIT LOW MDM: CPT

## 2020-06-30 NOTE — XRAY REPORT
LEFT HAND 3 VIEWS



INDICATION / CLINICAL INFORMATION:

pain sp fall.



COMPARISON:

None available.



FINDINGS:

No fracture, dislocation or soft tissue swelling is seen within the left hand. Chondrocalcinosis is s
een within the TFCC. Moderate degenerative arthrosis is seen within the little finger DIP joint with 
mild degenerative arthrosis of the index, long, ring finger DIP and little finger PIP joints



IMPRESSION:

1. No fracture of left hand.

2. CPPD arthropathy.



Signer Name: Mauricio Wang MD 

Signed: 6/30/2020 11:49 AM

Workstation Name: [id number]

## 2020-06-30 NOTE — XRAY REPORT
RIGHT WRIST 3 VIEWS



INDICATION / CLINICAL INFORMATION:

pain sp fall.



COMPARISON:

None available.



FINDINGS:

Chondrocalcinosis is present within the TFCC. There is moderately advanced degenerative arthrosis of 
the radiocarpal joint with probable widening of scapholunate interval and developing SLAC wrist relat
ed to CPPD arthropathy. No acute fracture, dislocation or soft tissue swelling is seen within the rig
ht wrist.



Signer Name: Mauricio Wang MD 

Signed: 6/30/2020 11:51 AM

Workstation Name: [id number]

## 2020-06-30 NOTE — EVENT NOTE
ED Screening Note


ED Screening Note: 


7 m hx of l finger deformed 4rth and r wrist pain





This initial assessment/diagnostic orders/clinical plan/treatment(s) is/are 

subject to change based on patients health status, clinical progression and re-

assessment by fellow clinical providers in the ED. Further treatment and workup 

at subsequent clinical providers discretion. Patient/guardian urged not to elope

from the ED as their condition may be serious if not clinically assessed and 

managed. 





Initial orders include: 


xray
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
## 2021-05-19 ENCOUNTER — HOSPITAL ENCOUNTER (EMERGENCY)
Dept: HOSPITAL 5 - ED | Age: 61
Discharge: HOME | End: 2021-05-19
Payer: COMMERCIAL

## 2021-05-19 VITALS — DIASTOLIC BLOOD PRESSURE: 95 MMHG | SYSTOLIC BLOOD PRESSURE: 154 MMHG
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
## 2021-05-19 DIAGNOSIS — G89.29: ICD-10-CM

## 2021-05-19 DIAGNOSIS — K21.9: ICD-10-CM

## 2021-05-19 DIAGNOSIS — S60.212A: ICD-10-CM
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
## 2021-05-19 DIAGNOSIS — M25.552: ICD-10-CM

## 2021-05-19 DIAGNOSIS — J44.9: ICD-10-CM

## 2021-05-19 DIAGNOSIS — M25.551: ICD-10-CM

## 2021-05-19 DIAGNOSIS — I10: ICD-10-CM

## 2021-05-19 DIAGNOSIS — S60.211A: ICD-10-CM

## 2021-05-19 DIAGNOSIS — Z79.899: ICD-10-CM
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
## 2021-05-19 DIAGNOSIS — M19.90: ICD-10-CM

## 2021-05-19 DIAGNOSIS — Z79.82: ICD-10-CM

## 2021-05-19 DIAGNOSIS — E11.9: ICD-10-CM

## 2021-05-19 DIAGNOSIS — F17.200: ICD-10-CM

## 2021-05-19 DIAGNOSIS — S39.012A: Primary | ICD-10-CM

## 2021-05-19 PROCEDURE — 73521 X-RAY EXAM HIPS BI 2 VIEWS: CPT

## 2021-05-19 PROCEDURE — 99283 EMERGENCY DEPT VISIT LOW MDM: CPT

## 2021-05-19 PROCEDURE — 96372 THER/PROPH/DIAG INJ SC/IM: CPT

## 2021-05-19 PROCEDURE — 73100 X-RAY EXAM OF WRIST: CPT

## 2021-05-19 PROCEDURE — 72100 X-RAY EXAM L-S SPINE 2/3 VWS: CPT
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
## 2021-08-02 ENCOUNTER — HOSPITAL ENCOUNTER (EMERGENCY)
Dept: HOSPITAL 5 - ED | Age: 61
LOS: 1 days | Discharge: LEFT BEFORE DISCHARGE | End: 2021-08-03
Payer: MEDICARE

## 2021-08-02 VITALS — SYSTOLIC BLOOD PRESSURE: 162 MMHG | DIASTOLIC BLOOD PRESSURE: 102 MMHG

## 2021-08-02 DIAGNOSIS — Z53.21: ICD-10-CM

## 2021-08-02 DIAGNOSIS — K21.9: Primary | ICD-10-CM

## 2021-08-02 PROCEDURE — 70360 X-RAY EXAM OF NECK: CPT

## 2021-08-03 NOTE — XRAY REPORT
NECK SOFT TISSUE 2 VIEW(S)



INDICATION / CLINICAL INFORMATION:  foreign body



COMPARISON: CT dated 10/16/19

 

FINDINGS:



EPIGLOTTIS: No significant abnormality.

RETROPHARYNGEAL SOFT TISSUES: No significant abnormality.

AIRWAY: No significant abnormality.

RADIOPAQUE FOREIGN BODY: None.

SKELETAL SYSTEM: No significant abnormality.



ADDITIONAL FINDINGS: Round density projecting over the lower left neck on the AP view likely represen
ts a skin lesion. No change.



IMPRESSION:

1. No acute abnormality.





Signer Name: KENISHA Cain MD 

Signed: 8/3/2021 1:22 AM

Workstation Name: [id number]

## 2022-05-09 ENCOUNTER — HOSPITAL ENCOUNTER (INPATIENT)
Dept: HOSPITAL 5 - ED | Age: 62
LOS: 2 days | Discharge: HOME HEALTH | DRG: 192 | End: 2022-05-11
Attending: STUDENT IN AN ORGANIZED HEALTH CARE EDUCATION/TRAINING PROGRAM | Admitting: INTERNAL MEDICINE
Payer: MEDICARE

## 2022-05-09 DIAGNOSIS — R07.9: ICD-10-CM

## 2022-05-09 DIAGNOSIS — K21.9: ICD-10-CM

## 2022-05-09 DIAGNOSIS — I10: ICD-10-CM

## 2022-05-09 DIAGNOSIS — M19.90: ICD-10-CM

## 2022-05-09 DIAGNOSIS — I25.10: ICD-10-CM

## 2022-05-09 DIAGNOSIS — Z87.891: ICD-10-CM

## 2022-05-09 DIAGNOSIS — J44.1: Primary | ICD-10-CM

## 2022-05-09 LAB
ALBUMIN SERPL-MCNC: 4.7 G/DL (ref 3.9–5)
ALT SERPL-CCNC: 14 UNITS/L (ref 7–56)
BASOPHILS # (AUTO): 0.1 K/MM3 (ref 0–0.1)
BASOPHILS NFR BLD AUTO: 0.9 % (ref 0–1.8)
BILIRUB UR QL STRIP: (no result)
BLOOD UR QL VISUAL: (no result)
BUN SERPL-MCNC: 6 MG/DL (ref 9–20)
BUN SERPL-MCNC: 6 MG/DL (ref 9–20)
BUN/CREAT SERPL: 8 %
BUN/CREAT SERPL: 8 %
CALCIUM SERPL-MCNC: 9.3 MG/DL (ref 8.4–10.2)
CALCIUM SERPL-MCNC: 9.3 MG/DL (ref 8.4–10.2)
EOSINOPHIL # BLD AUTO: 0.9 K/MM3 (ref 0–0.4)
EOSINOPHIL NFR BLD AUTO: 11.6 % (ref 0–4.3)
HCT VFR BLD CALC: 40.2 % (ref 35.5–45.6)
HEMOLYSIS INDEX: 11
HEMOLYSIS INDEX: 6
HGB BLD-MCNC: 13.2 GM/DL (ref 11.8–15.2)
LYMPHOCYTES # BLD AUTO: 2.3 K/MM3 (ref 1.2–5.4)
LYMPHOCYTES NFR BLD AUTO: 29.8 % (ref 13.4–35)
MCHC RBC AUTO-ENTMCNC: 33 % (ref 32–34)
MCV RBC AUTO: 91 FL (ref 84–94)
MONOCYTES # (AUTO): 0.7 K/MM3 (ref 0–0.8)
MONOCYTES % (AUTO): 9.1 % (ref 0–7.3)
MUCOUS THREADS #/AREA URNS HPF: (no result) /HPF
PH UR STRIP: 7 [PH] (ref 5–7)
PLATELET # BLD: 260 K/MM3 (ref 140–440)
PROT UR STRIP-MCNC: (no result) MG/DL
RBC # BLD AUTO: 4.43 M/MM3 (ref 3.65–5.03)
RBC #/AREA URNS HPF: 4 /HPF (ref 0–6)
UROBILINOGEN UR-MCNC: < 2 MG/DL (ref ?–2)
WBC #/AREA URNS HPF: < 1 /HPF (ref 0–6)

## 2022-05-09 PROCEDURE — 85025 COMPLETE CBC W/AUTO DIFF WBC: CPT

## 2022-05-09 PROCEDURE — 93005 ELECTROCARDIOGRAM TRACING: CPT

## 2022-05-09 PROCEDURE — 71046 X-RAY EXAM CHEST 2 VIEWS: CPT

## 2022-05-09 PROCEDURE — 96374 THER/PROPH/DIAG INJ IV PUSH: CPT

## 2022-05-09 PROCEDURE — 94640 AIRWAY INHALATION TREATMENT: CPT

## 2022-05-09 PROCEDURE — 81001 URINALYSIS AUTO W/SCOPE: CPT

## 2022-05-09 PROCEDURE — 84145 PROCALCITONIN (PCT): CPT

## 2022-05-09 PROCEDURE — 73521 X-RAY EXAM HIPS BI 2 VIEWS: CPT

## 2022-05-09 PROCEDURE — 80048 BASIC METABOLIC PNL TOTAL CA: CPT

## 2022-05-09 PROCEDURE — 99285 EMERGENCY DEPT VISIT HI MDM: CPT

## 2022-05-09 PROCEDURE — C8929 TTE W OR WO FOL WCON,DOPPLER: HCPCS

## 2022-05-09 PROCEDURE — 36415 COLL VENOUS BLD VENIPUNCTURE: CPT

## 2022-05-09 PROCEDURE — 94644 CONT INHLJ TX 1ST HOUR: CPT

## 2022-05-09 PROCEDURE — 93306 TTE W/DOPPLER COMPLETE: CPT

## 2022-05-09 PROCEDURE — 83880 ASSAY OF NATRIURETIC PEPTIDE: CPT

## 2022-05-09 PROCEDURE — 90686 IIV4 VACC NO PRSV 0.5 ML IM: CPT

## 2022-05-09 PROCEDURE — 82962 GLUCOSE BLOOD TEST: CPT

## 2022-05-09 PROCEDURE — 84484 ASSAY OF TROPONIN QUANT: CPT

## 2022-05-09 PROCEDURE — 80053 COMPREHEN METABOLIC PANEL: CPT
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
## 2022-05-09 NOTE — XRAY REPORT
CHEST 2 VIEWS 



INDICATION / CLINICAL INFORMATION: Asthma.



COMPARISON: 10/17/19.



FINDINGS:



SUPPORT DEVICES: None.

HEART / MEDIASTINUM: The heart size and pulmonary vasculature are normal. 

LUNGS / PLEURA: The lungs are mildly hyperinflated, but clear. No pneumothorax. 



ADDITIONAL FINDINGS: No significant additional findings.



IMPRESSION: Mild hyperinflation of the lungs may be seen with asthma or emphysema. No evidence of pne
umonia.



Signer Name: Philip Greer MD 

Signed: 5/9/2022 6:16 PM

Workstation Name: [id number]
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
## 2022-05-10 LAB
BUN SERPL-MCNC: 8 MG/DL (ref 9–20)
BUN/CREAT SERPL: 11 %
CALCIUM SERPL-MCNC: 9 MG/DL (ref 8.4–10.2)
HEMOLYSIS INDEX: 5

## 2022-05-10 RX ADMIN — IPRATROPIUM BROMIDE AND ALBUTEROL SULFATE SCH: .5; 3 SOLUTION RESPIRATORY (INHALATION) at 23:43

## 2022-05-10 RX ADMIN — ASPIRIN SCH: 325 TABLET, COATED ORAL at 14:02

## 2022-05-10 RX ADMIN — GUAIFENESIN PRN MG: 100 SOLUTION ORAL at 17:38

## 2022-05-10 RX ADMIN — BUDESONIDE SCH MG: 0.5 INHALANT RESPIRATORY (INHALATION) at 20:52

## 2022-05-10 RX ADMIN — IPRATROPIUM BROMIDE AND ALBUTEROL SULFATE SCH AMPUL: .5; 3 SOLUTION RESPIRATORY (INHALATION) at 20:53

## 2022-05-10 RX ADMIN — IPRATROPIUM BROMIDE AND ALBUTEROL SULFATE SCH AMPUL: .5; 3 SOLUTION RESPIRATORY (INHALATION) at 11:06

## 2022-05-10 RX ADMIN — METHYLPREDNISOLONE SODIUM SUCCINATE SCH MG: 40 INJECTION, POWDER, FOR SOLUTION INTRAMUSCULAR; INTRAVENOUS at 06:08

## 2022-05-10 RX ADMIN — IPRATROPIUM BROMIDE AND ALBUTEROL SULFATE SCH AMPUL: .5; 3 SOLUTION RESPIRATORY (INHALATION) at 02:20

## 2022-05-10 RX ADMIN — METHYLPREDNISOLONE SODIUM SUCCINATE SCH MG: 40 INJECTION, POWDER, FOR SOLUTION INTRAMUSCULAR; INTRAVENOUS at 12:42

## 2022-05-10 RX ADMIN — PANTOPRAZOLE SODIUM SCH: 40 TABLET, DELAYED RELEASE ORAL at 22:15

## 2022-05-10 RX ADMIN — LISINOPRIL SCH: 20 TABLET ORAL at 16:21

## 2022-05-10 RX ADMIN — ARFORMOTEROL TARTRATE SCH MCG: 15 SOLUTION RESPIRATORY (INHALATION) at 20:52

## 2022-05-10 RX ADMIN — IPRATROPIUM BROMIDE AND ALBUTEROL SULFATE SCH AMPUL: .5; 3 SOLUTION RESPIRATORY (INHALATION) at 06:10

## 2022-05-10 RX ADMIN — CELECOXIB SCH: 200 CAPSULE ORAL at 22:15

## 2022-05-10 RX ADMIN — IPRATROPIUM BROMIDE AND ALBUTEROL SULFATE SCH: .5; 3 SOLUTION RESPIRATORY (INHALATION) at 16:23

## 2022-05-10 RX ADMIN — Medication SCH: at 12:10

## 2022-05-10 NOTE — CONSULTATION
History of Present Illness


Consult date: 05/10/22


Consult reason: shortness of breath


History of present illness: 





The patient is a 61-year-old man with a history of asthma and COPD.  He used to 

be a chronic tobacco user.  His chronic lung disease is managed by home 

nebulizer system and frequent follow-ups with an outpatient pulmonary physician.

 He has no properly documented cardiac history.  There is no history of coronary

artery disease, and most recent echocardiogram in our system from 4 years ago 

documented a left ventricular ejection fraction of 45 to 50%.





He presents to the hospital at this time with shortness of breath, cough, chest 

tightness and diffuse wheezing.  He tried his home inhalers which did not 

improve his symptoms and he presented to the emergency room.  He was evaluated, 

and referred for admission and cardiac consultation was requested.  At this michelle

e, despite in-hospital bronchodilator management, he still has diffuse 

expiratory wheezes in both lung fields.  Patient has no anginal type chest pain,

no palpitations, no orthopnea and no lower extremity edema.





Cardiac work-up so far, serial ECG showed normal sinus rhythm, no ST or T wave 

abnormalities of ischemia or infarction.  Serial troponin levels x3 were normal.

 Chest x-ray showed a normal-sized cardiac silhouette and clear lungs.





Past History


Past Medical History: COPD, diabetes, hypertension, other (Asthma)


Social history: smoking (Former smoker)





Medications and Allergies


                                    Allergies











Allergy/AdvReac Type Severity Reaction Status Date / Time


 


No Known Allergies Allergy   Verified 09/12/18 09:11











                                Home Medications











 Medication  Instructions  Recorded  Confirmed  Last Taken  Type


 


Omeprazole [PriLOSEC] 20 mg PO QDAY #30 capsule. 06/11/15 05/22/17 09/18/17 Rx


 


Acetaminophen/Codeine [Tylenol 1 tab PO Q6H PRN #14 tablet 05/25/17 09/18/17 Rx





/Codeine # 3 tab]     


 


Albuterol Sulfate [Ventolin HFA] 2 puff IH Q4H PRN #1 hfa.aer.ad 05/25/17 09/18/17 Rx


 


Aspirin EC [Halfprin EC] 81 mg PO QDAY #30 tablet 05/25/17 09/18/17 Rx


 


Baclofen [Lioresal] 20 mg PO BID #30 tablet 05/25/17 09/18/17 Rx


 


Cyclobenzaprine [Flexeril 10 MG 10 mg PO Q8H PRN #14 tablet 05/25/17 09/18/17 

Rx





TAB]     


 


Fluticasone [Flonase] 1 spray NS QDAY #1 bottle 05/25/17 09/18/17 Rx


 


Fluticasone/Salmeterol [Advair 1 each IH DAILY #1 disk.w.dev 05/25/17 09/18/17 

Rx





Diskus 250-50 mcg]     


 


Pantoprazole [Protonix TAB] 20 mg PO QDAY #30 tablet.dr 05/25/17 09/18/17 Rx


 


amLODIPine 5 mg PO DAILY #30 tablet 05/25/17 09/18/17 Rx


 


lisinopriL [Zestril TAB] 20 mg PO QDAY #30 tablet 05/25/17 09/18/17 Rx


 


polyethylene glycoL 3350 [Miralax 17 gm PO QDAY PRN #30 powd.pack 05/25/17 09/18/17 Rx





3350]     


 


Albuterol Mdi (or & Nicu Only) 2 puff IH QID PRN #1 inhalation 09/18/17  Unknown

Rx





[ProAir HFA Inhaler]     


 


Ibuprofen [Motrin 600 MG tab] 600 mg PO Q8H PRN #30 tablet 09/18/17  Unknown Rx


 


Sodium Chloride [Saline Nasal 1 - 2 spray NS PRN PRN #1 bottle 09/18/17  Unknown

 Rx





Spray]     


 


Lansoprazole [Prevacid] 15 mg PO BID #60 cap 05/20/18  Unknown Rx


 


ALBUTEROL Inhaler(NF) [VENTOLIN 1 puff IH Q4-6H PRN #1 inha 01/13/19  Unknown Rx





Inhaler(NF)]     


 


Benzonatate [Tessalon Perle] 100 mg PO Q8H PRN #20 capsule 01/13/19  Unknown Rx


 


Cyclobenzaprine [Flexeril 10 MG 10 mg PO TID PRN #30 tablet 04/19/19  Unknown Rx





TAB]     


 


Menthol/Camphor [Tiger Balm 1 applicatio TP QID PRN #1 tube 04/19/19  Unknown Rx





Ointment]     


 


Acetaminophen [Non-Aspirin Extra 500 mg PO Q6HR PRN #30 tablet 08/08/19  Unknown

 Rx





Strength]     


 


Albuterol Sulfate [Proair 90 mcg IH Q4HR PRN #2 aer.pow.ba 08/08/19  Unknown Rx





Respiclick]     


 


Chlorhexidine Mouthwash [Peridex] 15 ml MM BID #1 bottle 08/08/19  Unknown Rx


 


Nicotine [Habitrol] 21 mg TD DAILY #30 patch 08/08/19  Unknown Rx


 


HYDROcodone/APAP 5-325 [[place] 1 each PO Q6HR PRN #14 tablet 10/18/19  Unknown Rx





5-325 mg TAB]     


 


Prednisone [predniSONE 5 mg (6-Day 5 mg PO .TAPER #1 tab.ds.pk 10/18/19  Unknown

 Rx





Pack, 21 Tabs)]     


 


methOCARBAMOL [Robaxin TAB] 500 mg PO Q6H PRN #15 tablet 10/18/19  Unknown Rx


 


Naproxen [Naprosyn] 500 mg PO BID PRN #20 tablet 06/30/20  Unknown Rx


 


predniSONE [Deltasone] 20 mg PO DAILY #5 tablet 06/30/20  Unknown Rx


 


methOCARBAMOL [Robaxin TAB] 500 mg PO Q8HR PRN #20 tablet 05/19/21  Unknown Rx











Active Meds: 


Active Medications





Acetaminophen (Acetaminophen 325 Mg Tab)  650 mg PO Q4H PRN


   PRN Reason: Pain MILD(1-3)/Fever >100.5/HA


Albuterol/Ipratropium (Ipratropium/Albuterol Sulfate 3 Ml Ampul.Neb)  1 ampul IH

Q4HRT [facility]


   Last Admin: 05/10/22 11:06 Dose:  1 ampul


   


Aspirin (Aspirin Ec 325 Mg Tab)  325 mg PO QDAY [facility]


Guaifenesin (Guaifenesin 100 Mg/5 Ml Oral Liqd)  200 mg PO Q4H PRN


   PRN Reason: Cough


Magnesium Hydroxide (Magnesium Hydroxide (Mom) Oral Liqd Udc)  30 ml PO Q4H PRN


   PRN Reason: Constipation


Methylprednisolone Sodium Succinate (Methylprednisolone Sod Succinate 40 Mg/1 Ml

Inj)  40 mg IV Q8HR [facility]


   Last Admin: 05/10/22 06:08 Dose:  40 mg


   


Morphine Sulfate (Morphine 2 Mg/1 Ml Inj)  2 mg IV Q4H PRN


   PRN Reason: Pain, Moderate (4-6)


   Last Admin: 05/10/22 01:29 Dose:  2 mg


   


Morphine Sulfate (Morphine 4 Mg/1 Ml Inj)  4 mg IV Q4H PRN


   PRN Reason: Pain , Severe (7-10)


Morphine Sulfate (Morphine 4 Mg/1 Ml Inj)  2 mg IV Q5MIN PRN


   PRN Reason: Chest Pain unrelieved by NTG


Ondansetron HCl (Ondansetron 4 Mg/2 Ml Inj)  4 mg IV Q8H PRN


   PRN Reason: Nausea And Vomiting


Sodium Chloride (Sodium Chloride 0.9% 10 Ml Flush Syringe)  10 ml IV BID BRIGID


Sodium Chloride (Sodium Chloride 0.9% 10 Ml Flush Syringe)  10 ml IV PRN PRN


   PRN Reason: LINE FLUSH


Tramadol HCl (Tramadol 50 Mg Tab)  50 mg PO Q6H PRN


   PRN Reason: Pain, Moderate (4-6)


   Last Admin: 05/10/22 07:55 Dose:  50 mg


   











Review of Systems


Cardiovascular: chest pain, shortness of breath, no orthopnea, no palpitations, 

no rapid/irregular heart beat, no edema, no syncope, no lightheadedness





Physical Examination


                                   Vital Signs











Temp Pulse Resp BP Pulse Ox


 


 98.9 F   60   18   146/65   99 


 


 05/09/22 16:35  05/09/22 16:35  05/09/22 16:35  05/09/22 16:35  05/09/22 16:35











General appearance: no acute distress


HEENT: Positive: PERRL


Neck: Positive: neck supple


Cardiac: Positive: Reg Rate and Rhythm


Lungs: Positive: Wheezes (Diffuse bilateral expiratory wheezes)


Neuro: Positive: Grossly Intact


Abdomen: Positive: Soft


Male genitourinary: Positive: deferred


Skin: Positive: Clear


Extremities: Absent: edema





Results





                                 05/09/22 16:54





                                 05/10/22 05:51


                                 Cardiac Enzymes











  05/09/22 Range/Units





  16:54 


 


AST  15  (5-40)  units/L








                                       CBC











  05/09/22 Range/Units





  16:54 


 


WBC  7.8  (4.5-11.0)  K/mm3


 


RBC  4.43  (3.65-5.03)  M/mm3


 


Hgb  13.2  (11.8-15.2)  gm/dl


 


Hct  40.2  (35.5-45.6)  %


 


Plt Count  260  (140-440)  K/mm3


 


Lymph # (Auto)  2.3  (1.2-5.4)  K/mm3


 


Mono # (Auto)  0.7  (0.0-0.8)  K/mm3


 


Eos # (Auto)  0.9 H  (0.0-0.4)  K/mm3


 


Baso # (Auto)  0.1  (0.0-0.1)  K/mm3








                          Comprehensive Metabolic Panel











  05/09/22 05/09/22 05/10/22 Range/Units





  16:54 22:45 05:51 


 


Sodium  144  142  139  (137-145)  mmol/L


 


Potassium  4.0  4.3  4.3  (3.6-5.0)  mmol/L


 


Chloride  109.3 H  107.4 H  104.5  ()  mmol/L


 


Carbon Dioxide  21 L  22  21 L  (22-30)  mmol/L


 


BUN  6 L  6 L  8 L  (9-20)  mg/dL


 


Creatinine  0.8  0.8  0.7 L  (0.8-1.3)  mg/dL


 


Glucose  99  134 H  132 H  ()  mg/dL


 


Calcium  9.3  9.3  9.0  (8.4-10.2)  mg/dL


 


AST  15    (5-40)  units/L


 


ALT  14    (7-56)  units/L


 


Alkaline Phosphatase  68    ()  units/L


 


Total Protein  6.7    (6.3-8.2)  g/dL


 


Albumin  4.7    (3.9-5)  g/dL














EKG interpretations





- Telemetry


EKG Rhythm: Sinus Rhythm (With no acute ST or T wave changes)





Assessment and Plan





- Patient Problems


(1) Shortness of breath


Current Visit: Yes   Status: Acute   


Plan to address problem: 


61-year-old patient with long history of asthma and COPD, presents with cough, 

shortness of breath and diffuse, persistent expiratory wheezes.  Symptoms appear

consistent with exacerbation of his COPD.  Serial ECGs sinus rhythm with no 

ischemic changes, serial troponin levels are normal.





At this time, patient is not a candidate for pharmacologic stress test due to 

ongoing diffuse bronchospasm, we will cancel the ordered stress test.  I will 

instead order an echocardiogram for left ventricular function assessment.  Will 

defer to internal medicine and pulmonary for further management of the patient's

presenting asthma exacerbation.

## 2022-05-10 NOTE — XRAY REPORT
BILATERAL HIPS WITH PELVIS 3 VIEWS



INDICATION:  hip pain. 



COMPARISON: 5/19/2021



IMPRESSION:  Severe end-stage osteoarthritic disease is identified at the right hip. There is near co
mplete loss of joint space, osteophytosis and subchondral cyst.  There are moderate osteoarthritic ch
anges at the left hip which appear slightly advanced since the previous exam on 5/19/2021. No acute o
sseous abnormality is appreciated.



Signer Name: Costa Tucker Jr, MD 

Signed: 5/10/2022 2:54 PM

Workstation Name: [id number]
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
## 2022-05-10 NOTE — PROGRESS NOTE
Assessment and Plan


Assessment and plan: 





#Atypical Chest pain


-patient history not consistent with ACS


-troponin negative x3


-stress test cancelled


-TTE 5/09: EF 50-55%





#Acute exacerbation of COPD with asthma


-continues steroids and nebulizers


-patient stable on RA


-Pulmonology consulted





#Osteoarthritis


-Xray of hip/pelvis shows R sided severe osteoarthritis


-patient declined PT, more concerned with pain control


-will start PRN NSAIDs for pain


-will need to follow up with Orthopaedic surgery outpatient





#Advanced care planning


-Disease education conducted, care plan discussed, diagnoses discussed, 

prognosis discussed, and patient acknowledges understanding with care plan


-Time: +30 min











History


Interval history: 





No acute events overnight.  Patient reports chronic bilateral hip pain which has

not been followed up.  He also has other chronic concerns.  We discussed need 

for outpatient follow-up.  Denies chest pain, shortness of breath at this time. 

Has a cough that has improved.





Hospitalist Physical





- Physical exam


Narrative exam: 





GENERAL: Thin elderly male. Sitting on the side of the bed in no acute distress.


HEENT: Poor dentition.


NECK: Supple.  


CHEST/LUNGS: CTAB on room air


HEART/CARDIOVASCULAR: RRR. No murmur, rubs or gallops appreciated.


ABDOMEN: +BS. NT/ND.


SKIN: No rashes noted.


NEURO:  No focal motor deficit.  Follows all commands and is ambulatory.


MUSCULOSKELETAL: No joint effusion 


EXTREMITIES: No cyanosis, clubbing or edema.


PSYCH: Cooperative.





- Constitutional


Vitals: 


                                        











Temp Pulse Resp BP Pulse Ox


 


 98.1 F   80   18   120/77   97 


 


 05/10/22 08:15  05/10/22 10:50  05/10/22 10:50  05/10/22 10:22  05/10/22 11:16











General appearance: Present: no acute distress





HEART Score





- HEART Score


EKG: Normal


Age: 45-65


Risk factors: > 3 risk factors or hx of atherosclerotic disease


Troponin: 


                                        











Troponin T  < 0.010 ng/mL (0.00-0.029)   05/10/22  05:51    











Troponin: < normal limit





- Critical Actions


Critical Actions: 0-3 pts:0.9-1.7%risk of adverse cardiac event.Candidate for 

discharge





Results





- Labs


CBC & Chem 7: 


                                 05/09/22 16:54





                                 05/10/22 05:51


Labs: 


                             Laboratory Last Values











WBC  7.8 K/mm3 (4.5-11.0)   05/09/22  16:54    


 


RBC  4.43 M/mm3 (3.65-5.03)   05/09/22  16:54    


 


Hgb  13.2 gm/dl (11.8-15.2)   05/09/22  16:54    


 


Hct  40.2 % (35.5-45.6)   05/09/22  16:54    


 


MCV  91 fl (84-94)   05/09/22  16:54    


 


MCH  30 pg (28-32)   05/09/22  16:54    


 


MCHC  33 % (32-34)   05/09/22  16:54    


 


RDW  13.1 % (13.2-15.2)  L  05/09/22  16:54    


 


Plt Count  260 K/mm3 (140-440)   05/09/22  16:54    


 


Lymph % (Auto)  29.8 % (13.4-35.0)   05/09/22  16:54    


 


Mono % (Auto)  9.1 % (0.0-7.3)  H  05/09/22  16:54    


 


Eos % (Auto)  11.6 % (0.0-4.3)  H  05/09/22  16:54    


 


Baso % (Auto)  0.9 % (0.0-1.8)   05/09/22  16:54    


 


Lymph # (Auto)  2.3 K/mm3 (1.2-5.4)   05/09/22  16:54    


 


Mono # (Auto)  0.7 K/mm3 (0.0-0.8)   05/09/22  16:54    


 


Eos # (Auto)  0.9 K/mm3 (0.0-0.4)  H  05/09/22  16:54    


 


Baso # (Auto)  0.1 K/mm3 (0.0-0.1)   05/09/22  16:54    


 


Seg Neutrophils %  48.6 % (40.0-70.0)   05/09/22  16:54    


 


Seg Neutrophils #  3.8 K/mm3 (1.8-7.7)   05/09/22  16:54    


 


Sodium  139 mmol/L (137-145)   05/10/22  05:51    


 


Potassium  4.3 mmol/L (3.6-5.0)   05/10/22  05:51    


 


Chloride  104.5 mmol/L ()   05/10/22  05:51    


 


Carbon Dioxide  21 mmol/L (22-30)  L  05/10/22  05:51    


 


Anion Gap  18 mmol/L  05/10/22  05:51    


 


BUN  8 mg/dL (9-20)  L  05/10/22  05:51    


 


Creatinine  0.7 mg/dL (0.8-1.3)  L  05/10/22  05:51    


 


Estimated GFR  > 60 ml/min  05/10/22  05:51    


 


BUN/Creatinine Ratio  11 %  05/10/22  05:51    


 


Glucose  132 mg/dL ()  H  05/10/22  05:51    


 


Calcium  9.0 mg/dL (8.4-10.2)   05/10/22  05:51    


 


Total Bilirubin  0.30 mg/dL (0.1-1.2)   05/09/22  16:54    


 


AST  15 units/L (5-40)   05/09/22  16:54    


 


ALT  14 units/L (7-56)   05/09/22  16:54    


 


Alkaline Phosphatase  68 units/L ()   05/09/22  16:54    


 


Troponin T  < 0.010 ng/mL (0.00-0.029)   05/10/22  05:51    


 


NT-Pro-B Natriuret Pep  110.6 pg/mL (0-900)   05/09/22  21:26    


 


Total Protein  6.7 g/dL (6.3-8.2)   05/09/22  16:54    


 


Albumin  4.7 g/dL (3.9-5)   05/09/22  16:54    


 


Albumin/Globulin Ratio  2.4 %  05/09/22  16:54    


 


Urine Color  Yellow  (Yellow)   05/09/22  Unknown


 


Urine Turbidity  Clear  (Clear)   05/09/22  Unknown


 


Urine pH  7.0  (5.0-7.0)   05/09/22  Unknown


 


Ur Specific Gravity  1.014  (1.003-1.030)   05/09/22  Unknown


 


Urine Protein  <15 mg/dl mg/dL (Negative)   05/09/22  Unknown


 


Urine Glucose (UA)  Neg mg/dL (Negative)   05/09/22  Unknown


 


Urine Ketones  Neg mg/dL (Negative)   05/09/22  Unknown


 


Urine Blood  Neg  (Negative)   05/09/22  Unknown


 


Urine Nitrite  Neg  (Negative)   05/09/22  Unknown


 


Urine Bilirubin  Neg  (Negative)   05/09/22  Unknown


 


Urine Urobilinogen  < 2.0 mg/dL (<2.0)   05/09/22  Unknown


 


Ur Leukocyte Esterase  Neg  (Negative)   05/09/22  Unknown


 


Urine WBC (Auto)  < 1.0 /HPF (0.0-6.0)   05/09/22  Unknown


 


Urine RBC (Auto)  4.0 /HPF (0.0-6.0)   05/09/22  Unknown


 


U Epithel Cells (Auto)  < 1.0 /HPF (0-13.0)   05/09/22  Unknown


 


Urine Mucus  Few /HPF  05/09/22  Unknown











[place]/IV: 


                                        





Voiding Method                   Urinal











Active Medications





- Current Medications


Current Medications: 














Generic Name Dose Route Start Last Admin





  Trade Name Freq  PRN Reason Stop Dose Admin


 


Acetaminophen  650 mg  05/09/22 22:02 





  Acetaminophen 325 Mg Tab  PO  





  Q4H PRN  





  Pain MILD(1-3)/Fever >100.5/HA  


 


Albuterol/Ipratropium  1 ampul  05/10/22 22:00 





  Ipratropium/Albuterol Sulfate 3 Ml Ampul.Neb  IH  





  BID BRIGID  


 


Arformoterol Tartrate  15 mcg  05/10/22 20:00 





  Arformoterol 15 Mcg/2 Ml Nebu  IH  





  Q12HRT BRIGID  


 


Aspirin  325 mg  05/10/22 10:00  05/10/22 14:02





  Aspirin Ec 325 Mg Tab  PO   Not Given





  QDAY BRIGID  


 


Budesonide  0.5 mg  05/10/22 20:00 





  Budesonide 0.5 Mg/2 Ml Nebu  IH  





  Q12HRT BRIGID  


 


Guaifenesin  200 mg  05/10/22 11:00 





  Guaifenesin 100 Mg/5 Ml Oral Liqd  PO  





  Q4H PRN  





  Cough  


 


Levofloxacin  500 mg  05/10/22 15:00 





  Levofloxacin 500 Mg Tab  PO  05/15/22 14:59 





  Q24HR BRIGID  





  Protocol  


 


Magnesium Hydroxide  30 ml  05/09/22 22:02 





  Magnesium Hydroxide (Mom) Oral Liqd Udc  PO  





  Q4H PRN  





  Constipation  


 


Methylprednisolone Sodium Succinate  40 mg  05/11/22 10:00 





  Methylprednisolone Sod Succinate 40 Mg/1 Ml Inj  IV  





  QDAY BRIGID  


 


Morphine Sulfate  2 mg  05/09/22 22:02  05/10/22 01:29





  Morphine 2 Mg/1 Ml Inj  IV   2 mg





  Q4H PRN   Administration





  Pain, Moderate (4-6)  


 


Morphine Sulfate  4 mg  05/09/22 22:02 





  Morphine 4 Mg/1 Ml Inj  IV  





  Q4H PRN  





  Pain , Severe (7-10)  


 


Morphine Sulfate  2 mg  05/09/22 22:02 





  Morphine 4 Mg/1 Ml Inj  IV  





  Q5MIN PRN  





  Chest Pain unrelieved by NTG  


 


Ondansetron HCl  4 mg  05/09/22 22:02 





  Ondansetron 4 Mg/2 Ml Inj  IV  





  Q8H PRN  





  Nausea And Vomiting  


 


Sodium Chloride  10 ml  05/10/22 10:00  05/10/22 12:10





  Sodium Chloride 0.9% 10 Ml Flush Syringe  IV   Not Given





  BID BRIGID  


 


Sodium Chloride  10 ml  05/09/22 22:02 





  Sodium Chloride 0.9% 10 Ml Flush Syringe  IV  





  PRN PRN  





  LINE FLUSH  


 


Tramadol HCl  50 mg  05/09/22 22:02  05/10/22 07:55





  Tramadol 50 Mg Tab  PO   50 mg





  Q6H PRN   Administration





  Pain, Moderate (4-6)

## 2022-05-11 VITALS — DIASTOLIC BLOOD PRESSURE: 84 MMHG | SYSTOLIC BLOOD PRESSURE: 140 MMHG

## 2022-05-11 RX ADMIN — ARFORMOTEROL TARTRATE SCH MCG: 15 SOLUTION RESPIRATORY (INHALATION) at 09:35

## 2022-05-11 RX ADMIN — BUDESONIDE SCH MG: 0.5 INHALANT RESPIRATORY (INHALATION) at 09:35

## 2022-05-11 RX ADMIN — GUAIFENESIN PRN MG: 100 SOLUTION ORAL at 05:37

## 2022-05-11 RX ADMIN — Medication SCH ML: at 09:24

## 2022-05-11 RX ADMIN — CELECOXIB SCH: 200 CAPSULE ORAL at 09:38

## 2022-05-11 RX ADMIN — PANTOPRAZOLE SODIUM SCH: 40 TABLET, DELAYED RELEASE ORAL at 09:38

## 2022-05-11 RX ADMIN — ASPIRIN SCH MG: 325 TABLET, COATED ORAL at 09:24

## 2022-05-11 RX ADMIN — IPRATROPIUM BROMIDE AND ALBUTEROL SULFATE SCH AMPUL: .5; 3 SOLUTION RESPIRATORY (INHALATION) at 09:35

## 2022-05-11 RX ADMIN — Medication SCH: at 07:41

## 2022-05-11 RX ADMIN — PANTOPRAZOLE SODIUM SCH MG: 40 TABLET, DELAYED RELEASE ORAL at 09:24

## 2022-05-11 RX ADMIN — LISINOPRIL SCH MG: 20 TABLET ORAL at 09:24

## 2022-05-11 NOTE — PROGRESS NOTE
Assessment and Plan








Acute exacerbation of chronic obstructive pulmonary disease


Chest pain at presentation


Hypertension


Chronic pain syndrome


Gastroesophageal reflux disease


Coronary artery disease


Possible medication noncompliance


History of tobacco abuse





- Pulmonary out patient follow up for PFTs and optimization of respiratory 

status (has f/up appointment with Dr. Stallings's office on 5/19/22)


- prn supplemental oxygen to keep O2 sats > 90%


- continue bronchodilators (LEONOR & LABA) with pulm hygiene per RT (Advair 

ordered and to be continued at discharge)


- systemic steroids with slow taper (7 day prednisone taper at discharge)


- continue inhaled corticosteroids


- avoid nephrotoxins, renally dose all medications


- continue mobility protocols to prevent pressure ulcers


- PT/OT as tolerated


- Wound care per RN/WCT


- continue accuchecks with glycemic control per SSI for target blood glucose < 

180 mg/dL 


- tobacco abstinence strongly counseled at the bedside  


- home oxygen evaluation at discharge


- GI & VTE prophylaxis


- Flu & pneumovax per protocol


- continue other care per attending / other consultants


- prn analgesia per pain score





... re-evaluate in am & prn





Subjective


Date of service: 05/11/22


Principal diagnosis: AE-COPD; Chest pain; HTN; Chronic pain syndrome; CAD; GERD


Interval history: 





Patient is seen today for: AE-COPD; Chest pain; HTN; Chronic pain syndrome; CAD;

GERD





Seen and examined at bedside; 24hour events reviewed; nursing and respiratory c

are staff consulted; no adverse overnight events reported to me; resting 

peacefully in bed; denies acute chest pain or palpitations; denies N/V/F/C














Objective


                               Vital Signs - 12hr











  05/11/22 05/11/22 05/11/22





  01:00 03:31 07:47


 


Temperature  98.3 F 98.4 F


 


Pulse Rate  80 64


 


Pulse Rate [   





Bilateral   





Throughout]   


 


Respiratory  17 





Rate   


 


Respiratory   





Rate [Bilateral   





Throughout]   


 


Blood Pressure  144/90 140/84


 


O2 Sat by Pulse 97 100 97





Oximetry   














  05/11/22 05/11/22





  09:38 10:00


 


Temperature  


 


Pulse Rate  70


 


Pulse Rate [ 80 





Bilateral  





Throughout]  


 


Respiratory  





Rate  


 


Respiratory 18 





Rate [Bilateral  





Throughout]  


 


Blood Pressure  


 


O2 Sat by Pulse  





Oximetry  











Constitutional: no acute distress


Eyes: non-icteric


ENT: oropharynx moist


Neck: supple, no lymphadenopathy, no JVD


Effort: normal


Ascultation: Bilateral: diminished breath sounds, other (prolonged expiratory 

phase)


Percussion: Bilateral: not dull


Cardiovascular: regular rate and rhythm


Gastrointestinal: normoactive bowel sounds, soft, non-tender, non-distended


Integumentary: normal


Extremities: no cyanosis, no edema, pulses normal, no ischemia or petechiae


Neurologic: non-focal exam, pupils equal and round, CN II-XII normal, motor 

strength normal and


Psychiatric: mood appropriate, affect normal


CBC and BMP: 


                                 05/09/22 16:54





                                 05/10/22 05:51


Abnormal lab findings: 


                                  Abnormal Labs











  05/09/22 05/09/22 05/09/22





  16:54 16:54 22:45


 


RDW  13.1 L  


 


Mono % (Auto)  9.1 H  


 


Eos % (Auto)  11.6 H  


 


Eos # (Auto)  0.9 H  


 


Chloride   109.3 H  107.4 H


 


Carbon Dioxide   21 L 


 


BUN   6 L  6 L


 


Creatinine   


 


Glucose    134 H


 


POC Glucose   














  05/10/22 05/10/22





  05:51 20:36


 


RDW  


 


Mono % (Auto)  


 


Eos % (Auto)  


 


Eos # (Auto)  


 


Chloride  


 


Carbon Dioxide  21 L 


 


BUN  8 L 


 


Creatinine  0.7 L 


 


Glucose  132 H 


 


POC Glucose   118 H











Allied health notes reviewed: nursing

## 2022-05-11 NOTE — DISCHARGE SUMMARY
Providers





- Providers


Date of Admission: 


05/09/22 22:03





Date of discharge: 05/11/22


Attending physician: 


ERIK SARAVIA MD





                                        





05/09/22


Consult to Cardiac Rehabilitation [CONS] Routine 


   Reason For Exam: Phase I





05/09/22 22:03


Consult to Cardiology [CONS] Routine 


   Consulting Provider: JONAH CANTU


   Reason For Exam: Chest Pain





05/10/22 12:49


Consult to Physician [CONS] Routine 


   Comment: 


   Consulting Provider: DANTE GEIGER


   Physician Instructions: 


   Reason For Exam: COPD exacerbation











Primary care physician: 


PRIMARY CARE MD








Hospitalization


Reason for admission: COPD exacerbation, ACS rule out


Condition: Stable


Hospital course: 





61-year-old male with history of hypertension, chronic polyarticular arthritis, 

coronary artery disease and COPD who presented to the emergency room with 

complaint of left-sided chest pain x1 week.  He was admitted for ACS rule out 

and acute COPD exacerbation.  Chest x-ray was negative for acute findings.  

Echocardiogram was within normal limits.  Cardiology was consulted and stress 

test was canceled due to believe that patient's chest pain sound more pulmonary 

in nature.  Pulmonology was consulted for management of patient's inhalers.  X-

ray of the hip and pelvis were ordered and showed severe osteoarthritis on the 

right.  Once stable, patient was discharged home.


Disposition: 06 HOME HEALTH CARE SERVICE


Final Discharge Diagnosis (Prints w/discharge instructions): Atypical chest 

pain.  Acute exacerbation of COPD.  Osteoarthritis


Time spent for discharge: 35 minutes





Core Measure Documentation





- Palliative Care


Palliative Care/ Comfort Measures: Not Applicable





- Core Measures


Any of the following diagnoses?: none





Exam





- Physical Exam


Narrative exam: 





GENERAL: Thin elderly male. Sitting on the side of the bed in no acute distress.


HEENT: Poor dentition.


NECK: Supple.  


CHEST/LUNGS: CTAB on room air


HEART/CARDIOVASCULAR: RRR. No murmur, rubs or gallops appreciated.


ABDOMEN: +BS. NT/ND.


SKIN: No rashes noted.


NEURO:  No focal motor deficit.  Follows all commands and is ambulatory.


MUSCULOSKELETAL: No joint effusion 


EXTREMITIES: No cyanosis, clubbing or edema.


PSYCH: Cooperative.





- Constitutional


Vitals: 


                                        











Temp Pulse Resp BP Pulse Ox


 


 98.3 F   80   17   144/90   100 


 


 05/11/22 03:31  05/11/22 03:31  05/11/22 03:31  05/11/22 03:31  05/11/22 03:31














Plan


Care Plan Goals: 


Please follow-up with Dr. Stallings within the next 2 weeks and your primary 

care provider soon as possible.


You have arthritis of your right hip.  It is best treated with increase physical

 activity despite the pain, visible therapy and you can take over-the-counter 

Tylenol for arthritis if your pain is unrelieved.


You may have your primary care provider refer you to orthopedic surgery for 

evaluation if your pain continues.


Make sure you use your inhalers as prescribed and complete all the medications 

prescribed to you this hospital stay.





Follow up with: 


PRIMARY CARE,MD [Primary Care Provider] - 7 Days


MASOUD STALLINGS MD [Staff Physician] - 14 Days


Prescriptions: 


Celecoxib [celeBREX] 200 mg PO QDAY 30 Days #30 capsule


Aspirin EC [Ecotrin] 325 mg PO QDAY 30 Days #30 tablet


Fluticasone Propion/Salmeterol [Fluticasone-Salmeterol 250-50] 1 each IH BID 30 

Days #2 each


levoFLOXacin [Levaquin TAB] 500 mg PO Q24HR 4 Days #4 tablet


Prednisone [predniSONE 5 mg (6-Day Pack, 21 Tabs)] 5 mg PO .TAPER 6 Days #1 each


Tiotropium [Spiriva] 18 mcg IH QDAY 30 Days #60 cap
